# Patient Record
Sex: FEMALE | Race: WHITE | NOT HISPANIC OR LATINO | Employment: FULL TIME | URBAN - METROPOLITAN AREA
[De-identification: names, ages, dates, MRNs, and addresses within clinical notes are randomized per-mention and may not be internally consistent; named-entity substitution may affect disease eponyms.]

---

## 2017-08-14 ENCOUNTER — TELEPHONE (OUTPATIENT)
Dept: NURSING | Facility: CLINIC | Age: 37
End: 2017-08-14

## 2017-08-14 DIAGNOSIS — N92.0 MENORRHAGIA WITH REGULAR CYCLE: ICD-10-CM

## 2017-08-14 RX ORDER — DROSPIRENONE AND ETHINYL ESTRADIOL 0.03MG-3MG
1 KIT ORAL DAILY
Qty: 28 TABLET | Refills: 0 | Status: SHIPPED | OUTPATIENT
Start: 2017-08-14 | End: 2017-09-05

## 2017-08-14 NOTE — TELEPHONE ENCOUNTER
Pt calling requesting refill for her BCP.  Pt understands that she needs to schedule her annual exam which is due in September.    Janice 3-0.03mg      Last Written Prescription Date:  9/26/16  Last Fill Quantity: 84,   # refills: 3  Last Office Visit with [facility] primary care provider:  9/26/2016  Future Office visit: NONE    Please contact our office for any further refills, Due for annual exam. This is a 1 month extension per [facility] protocol.

## 2017-09-03 ENCOUNTER — HEALTH MAINTENANCE LETTER (OUTPATIENT)
Age: 37
End: 2017-09-03

## 2017-09-05 ENCOUNTER — TELEPHONE (OUTPATIENT)
Dept: OBGYN | Facility: CLINIC | Age: 37
End: 2017-09-05
Payer: COMMERCIAL

## 2017-09-05 ENCOUNTER — TELEPHONE (OUTPATIENT)
Dept: OBGYN | Facility: CLINIC | Age: 37
End: 2017-09-05

## 2017-09-05 DIAGNOSIS — N92.0 MENORRHAGIA WITH REGULAR CYCLE: ICD-10-CM

## 2017-09-05 RX ORDER — DROSPIRENONE AND ETHINYL ESTRADIOL 0.03MG-3MG
1 KIT ORAL DAILY
Qty: 56 TABLET | Refills: 0 | Status: SHIPPED | OUTPATIENT
Start: 2017-09-05 | End: 2017-10-25

## 2017-09-05 NOTE — TELEPHONE ENCOUNTER
Pt is wanting a refill for her DONTA rx. She said she was already given one 30 day supply  . She is completely out at this point . She did schedule a yearly exam with AJ. But it is not until October

## 2017-10-25 ENCOUNTER — OFFICE VISIT (OUTPATIENT)
Dept: OBGYN | Facility: CLINIC | Age: 37
End: 2017-10-25
Payer: COMMERCIAL

## 2017-10-25 VITALS
WEIGHT: 135 LBS | BODY MASS INDEX: 22.49 KG/M2 | HEIGHT: 65 IN | DIASTOLIC BLOOD PRESSURE: 74 MMHG | SYSTOLIC BLOOD PRESSURE: 110 MMHG

## 2017-10-25 DIAGNOSIS — N92.0 MENORRHAGIA WITH REGULAR CYCLE: ICD-10-CM

## 2017-10-25 DIAGNOSIS — Z01.419 ENCOUNTER FOR GYNECOLOGICAL EXAMINATION WITHOUT ABNORMAL FINDING: Primary | ICD-10-CM

## 2017-10-25 PROCEDURE — G0145 SCR C/V CYTO,THINLAYER,RESCR: HCPCS | Performed by: OBSTETRICS & GYNECOLOGY

## 2017-10-25 PROCEDURE — 99395 PREV VISIT EST AGE 18-39: CPT | Performed by: OBSTETRICS & GYNECOLOGY

## 2017-10-25 PROCEDURE — 87624 HPV HI-RISK TYP POOLED RSLT: CPT | Performed by: OBSTETRICS & GYNECOLOGY

## 2017-10-25 ASSESSMENT — ANXIETY QUESTIONNAIRES
3. WORRYING TOO MUCH ABOUT DIFFERENT THINGS: NOT AT ALL
5. BEING SO RESTLESS THAT IT IS HARD TO SIT STILL: NOT AT ALL
6. BECOMING EASILY ANNOYED OR IRRITABLE: SEVERAL DAYS
IF YOU CHECKED OFF ANY PROBLEMS ON THIS QUESTIONNAIRE, HOW DIFFICULT HAVE THESE PROBLEMS MADE IT FOR YOU TO DO YOUR WORK, TAKE CARE OF THINGS AT HOME, OR GET ALONG WITH OTHER PEOPLE: NOT DIFFICULT AT ALL
7. FEELING AFRAID AS IF SOMETHING AWFUL MIGHT HAPPEN: NOT AT ALL
1. FEELING NERVOUS, ANXIOUS, OR ON EDGE: NOT AT ALL
GAD7 TOTAL SCORE: 1
2. NOT BEING ABLE TO STOP OR CONTROL WORRYING: NOT AT ALL

## 2017-10-25 ASSESSMENT — PATIENT HEALTH QUESTIONNAIRE - PHQ9
SUM OF ALL RESPONSES TO PHQ QUESTIONS 1-9: 2
5. POOR APPETITE OR OVEREATING: NOT AT ALL

## 2017-10-25 NOTE — LETTER
October 31, 2017    Shayna Dougherty  [address] [address] [address]    Dear Shayna,  We are happy to inform you that your PAP smear result from 10/25/17 is normal.  We are now able to do a follow up test on PAP smears. The DNA test is for HPV (Human Papilloma Virus). Cervical cancer is closely linked with certain types of HPV. Your result showed no evidence of high risk HPV.  Therefore we recommend you return in 3 years for your next pap smear.  You will still need to return to the clinic every year for an annual exam and other preventive tests.  Please contact the clinic at [phone] with any questions.  Sincerely,    Alfreda Cavanaugh MD/corey

## 2017-10-25 NOTE — PROGRESS NOTES
Shayna is a 37 year old  female who presents for annual exam.     Besides routine health maintenance, she has no other health concerns today .    HPI:  The patient does not have a PCP  Patient is doing really well. 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 Can't take on more though b/c so busy with the three kids to get them everywhere and really enjoys staying home and being there for them. marek is 11 and in 6th. Blake is 9 and heaven is 7.5.    Just last year went on ocps again d/t really heavy periods and acne. Is doin great on them. No issues, periods are much lighter, no cramping and skin has completely cleared up. No concerns regarding being back on ocps and would like to continue for the time being.    Exercising 1-2x/week but for the most part eat really quite healthy.      GYNECOLOGIC HISTORY:    Patient's last menstrual period was 10/06/2017 (exact date).  Her current contraception method is: oral contraceptives.  She  reports that she has never smoked. She has never used smokeless tobacco.      Patient is sexually active.  STD testing offered?  Declined  Last PHQ-9 score on record =   PHQ-9 SCORE 10/25/2017   Total Score 2     Last GAD7 score on record =   JARAD-7 SCORE 10/25/2017   Total Score 1     Alcohol Score = 1    HEALTH MAINTENANCE:  Cholesterol: (  Cholesterol   Date Value Ref Range Status   2016 157 <200 mg/dL Final    NA  Last Mammo: NA, Result: not applicable, Next Mammo: NA   Pap: 10/31/2012 WNL HPV (-)neg  Colonoscopy:  NA, Result: not applicable, Next Colonoscopy: NA years.  Dexa:  NA    Health maintenance updated:  yes    HISTORY:  Obstetric History       T3      L3     SAB0   TAB0   Ectopic0   Multiple0   Live Births3       # Outcome Date GA Lbr Yaidel/2nd Weight Sex Delivery Anes PTL Lv   4 Term 01/22/10 41w0d  8 lb 12 oz (3.969 kg) F Vag-Spont EPI  AIDEN      Name: Heaven   3 Term 08 38w0d  7 lb 15 oz (3.6 kg) M Vag-Spont EPI  AIDEN      Name: Blake  "  2 SAB 07 6w0d    SAB      1 Term 06 39w0d  7 lb 10 oz (3.459 kg) F Vag-Spont EPI  AIDEN      Name: Marissa          Patient Active Problem List   Diagnosis     Menorrhagia with regular cycle     Acne, unspecified acne type     Family history of ischemic heart disease     Past Surgical History:   Procedure Laterality Date     NO HISTORY OF SURGERY        Social History   Substance Use Topics     Smoking status: Never Smoker     Smokeless tobacco: Never Used     Alcohol use No      Problem (# of Occurrences) Relation (Name,Age of Onset)    Alzheimer Disease (1) Paternal Grandmother    Breast Cancer (1) Mother (58)    C.A.D. (1) Maternal Grandfather:  in 50's of MI    DIABETES (1) Mother: pre-diabetes    Hyperlipidemia (1) Mother    Hypertension (1) Mother    Myocardial Infarction (1) Mother (59)            Current Outpatient Prescriptions   Medication Sig     drospirenone-ethinyl estradiol (SARAVANAN) 3-0.03 MG per tablet Take 1 tablet by mouth daily     multivitamin, therapeutic with minerals (MULTI-VITAMIN) TABS Take 1 tablet by mouth daily     No current facility-administered medications for this visit.      No Known Allergies    Past medical, surgical, social and family histories were reviewed and updated in EPIC.    ROS:   12 point review of systems negative other than symptoms noted below.    EXAM:  /74  Ht 5' 5\" (1.651 m)  Wt 135 lb (61.2 kg)  LMP 10/06/2017 (Exact Date)  Breastfeeding? No  BMI 22.47 kg/m2   BMI: Body mass index is 22.47 kg/(m^2).    PHYSICAL EXAM:  Constitutional:  Appearance: Well nourished, well developed, alert, in no acute distress  Neck:  Lymph Nodes:  No lymphadenopathy present    Thyroid:  Gland size normal, nontender, no nodules or masses present  on palpation  Chest:  Respiratory Effort:  Breathing unlabored  Cardiovascular:    Heart: Auscultation:  Regular rate, normal rhythm, no murmurs present  Breasts: Palpation of Breasts and Axillae:  No masses present on " palpation, no breast tenderness. and No nodularity, asymmetry or nipple discharge bilaterally.  Gastrointestinal:   Abdominal Examination:  Abdomen nontender to palpation, tone normal without rigidity or guarding, no masses present, umbilicus without lesions   Liver and Spleen:  No hepatomegaly present, liver nontender to palpation    Hernias:  No hernias present  Lymphatic: Lymph Nodes:  No other lymphadenopathy present  Skin:  General Inspection:  No rashes present, no lesions present, no areas of  discoloration    Genitalia and Groin:  No rashes present, no lesions present, no areas of  discoloration, no masses present  Neurologic/Psychiatric:    Mental Status:  Oriented X3     Pelvic Exam:  External Genitalia:     Normal appearance for age, no discharge present, no tenderness present, no inflammatory lesions present, color normal  Vagina:     Normal vaginal vault without central or paravaginal defects, no discharge present, no inflammatory lesions present, no masses present  Bladder:     Nontender to palpation  Urethra:   Urethral Body:  Urethra palpation normal, urethra structural support normal   Urethral Meatus:  No erythema or lesions present  Cervix:     Appearance healthy, no lesions present, nontender to palpation, no bleeding present  Uterus:     Uterus: firm, normal sized and nontender, anteverted in position.   Adnexa:     No adnexal tenderness present, no adnexal masses present  Perineum:     Perineum within normal limits, no evidence of trauma, no rashes or skin lesions present  Anus:     Anus within normal limits, no hemorrhoids present  Inguinal Lymph Nodes:     No lymphadenopathy present  Pubic Hair:     Normal pubic hair distribution for age  Genitalia and Groin:     No rashes present, no lesions present, no areas of discoloration, no masses present      COUNSELING:   Reviewed preventive health counseling, as reflected in patient instructions    BMI: Body mass index is 22.47  kg/(m^2).        ASSESSMENT:  37 year old female with satisfactory annual exam.    ICD-10-CM    1. Encounter for gynecological examination without abnormal finding Z01.419 Pap imaged thin layer screen with HPV - recommended age 30 - 65     HPV High Risk Types DNA Cervical   2. Menorrhagia with regular cycle N92.0 drospirenone-ethinyl estradiol (SARAVANAN) 3-0.03 MG per tablet       PLAN:  Pap and cotesting done today as she was due  Will continue on her saravanan for menorrhagia and acne as it's working very well. Refills sent in.  Fasting labs great last year so no need to repeat for another 2-3 years at most and only then given her heart disease in family history.    Alfreda Cavanaugh MD

## 2017-10-25 NOTE — MR AVS SNAPSHOT
"              After Visit Summary   10/25/2017    Shayna Dougherty    MRN: [id number]           Patient Information     Date Of Birth          1980        Visit Information        Provider Department      10/25/2017 11:00 AM Alfreda Cavanaugh MD [facility]        Today's Diagnoses     Encounter for gynecological examination without abnormal finding    -  1    Menorrhagia with regular cycle           Follow-ups after your visit        Who to contact     If you have questions or need follow up information about today's clinic visit or your schedule please contact [facility] directly at [phone].  Normal or non-critical lab and imaging results will be communicated to you by [employer], letter or phone within 4 business days after the clinic has received the results. If you do not hear from us within 7 days, please contact the clinic through [employer] or phone. If you have a critical or abnormal lab result, we will notify you by phone as soon as possible.  Submit refill requests through [employer] or call your pharmacy and they will forward the refill request to us. Please allow 3 business days for your refill to be completed.          Additional Information About Your Visit        MyChart Information     [employer] lets you send messages to your doctor, view your test results, renew your prescriptions, schedule appointments and more. To sign up, go to [email or web address] . Click on \"Log in\" on the left side of the screen, which will take you to the Welcome page. Then click on \"Sign up Now\" on the right side of the page.     You will be asked to enter the access code listed below, as well as some personal information. Please follow the directions to create your username and password.     Your access code is: [email or web address]  Expires: 2018 12:36 PM     Your access code will  in 90 days. If you need help or a new code, please call your [place] clinic or [phone].      " "  Care EveryWhere ID     This is your Care EveryWhere ID. This could be used by other organizations to access your [facility] medical records  [id number]        Your Vitals Were     Height Last Period Breastfeeding? BMI (Body Mass Index)          5' 5\" (1.651 m) 10/06/2017 (Exact Date) No 22.47 kg/m2         Blood Pressure from Last 3 Encounters:   10/25/17 110/74   09/26/16 100/64   11/25/13 120/74    Weight from Last 3 Encounters:   10/25/17 135 lb (61.2 kg)   09/26/16 141 lb (64 kg)   11/25/13 142 lb (64.4 kg)              Today, you had the following     No orders found for display       Primary Care Provider Office Phone # Fax #    Fani Adrianna Calvert [phone] [address] [address] [facility] [address]        Equal Access to Services     [facility]: Hadii aad ku hadasho Soomaali, waaxda luqadaha, qaybta kaalmada adeegyada, waxay idiin hayaan kenton kingn . So [facility] [phone].    ATENCIÓN: Si habla español, tiene a yanez disposición servicios gratuitos de asistencia lingüística. Llame al [phone].    We comply with applicable federal civil rights laws and [place] laws. We do not discriminate on the basis of race, color, national origin, age, disability, sex, sexual orientation, or gender identity.            Thank you!     Thank you for choosing [facility]  for your care. Our goal is always to provide you with excellent care. Hearing back from our patients is one way we can continue to improve our services. Please take a few minutes to complete the written survey that you may receive in the mail after your visit with us. Thank you!             Your Updated Medication List - Protect others around you: Learn how to safely use, store and throw away your medicines at www.disposemymeds.org.          This list is accurate as of: 10/25/17 12:37 PM.  Always use your most recent med list.                   Brand Name Dispense Instructions for use Diagnosis    " drospirenone-ethinyl estradiol 3-0.03 MG per tablet    SARAVANAN    56 tablet    Take 1 tablet by mouth daily    Menorrhagia with regular cycle       Multi-vitamin Tabs tablet     100 tablet    Take 1 tablet by mouth daily

## 2017-10-26 ASSESSMENT — ANXIETY QUESTIONNAIRES: GAD7 TOTAL SCORE: 1

## 2017-10-27 LAB
COPATH REPORT: NORMAL
PAP: NORMAL

## 2017-10-28 RX ORDER — DROSPIRENONE AND ETHINYL ESTRADIOL 0.03MG-3MG
1 KIT ORAL DAILY
Qty: 84 TABLET | Refills: 4 | Status: SHIPPED | OUTPATIENT
Start: 2017-10-28 | End: 2018-11-30

## 2017-10-30 LAB
FINAL DIAGNOSIS: NORMAL
HPV HR 12 DNA CVX QL NAA+PROBE: NEGATIVE
HPV16 DNA SPEC QL NAA+PROBE: NEGATIVE
HPV18 DNA SPEC QL NAA+PROBE: NEGATIVE
SPECIMEN DESCRIPTION: NORMAL

## 2017-11-17 ENCOUNTER — TELEPHONE (OUTPATIENT)
Dept: NURSING | Facility: CLINIC | Age: 37
End: 2017-11-17

## 2017-11-17 DIAGNOSIS — Z13.0 SCREENING FOR IRON DEFICIENCY ANEMIA: Primary | ICD-10-CM

## 2017-11-17 DIAGNOSIS — R53.83 FATIGUE: ICD-10-CM

## 2017-11-17 NOTE — TELEPHONE ENCOUNTER
We certainly can do it but I think she wasn't on ocps then and bleeding heavy and now is and not bleeding heavy. But yes, can do iron, tibc, ferritin if she'd like can do a vit D as well as that may be even more related-if we haven't done one

## 2018-11-26 ENCOUNTER — TELEPHONE (OUTPATIENT)
Dept: OBGYN | Facility: CLINIC | Age: 38
End: 2018-11-26

## 2018-11-26 NOTE — TELEPHONE ENCOUNTER
Talked with patient re: overdue lab. She has an appt for her annual on Friday 11/30 so she will plan on having it drawn then.

## 2018-11-30 ENCOUNTER — OFFICE VISIT (OUTPATIENT)
Dept: OBGYN | Facility: CLINIC | Age: 38
End: 2018-11-30
Payer: COMMERCIAL

## 2018-11-30 VITALS
SYSTOLIC BLOOD PRESSURE: 120 MMHG | BODY MASS INDEX: 24.69 KG/M2 | HEIGHT: 65 IN | DIASTOLIC BLOOD PRESSURE: 78 MMHG | HEART RATE: 80 BPM | WEIGHT: 148.2 LBS

## 2018-11-30 DIAGNOSIS — Z23 NEED FOR TDAP VACCINATION: ICD-10-CM

## 2018-11-30 DIAGNOSIS — R53.83 FATIGUE, UNSPECIFIED TYPE: ICD-10-CM

## 2018-11-30 DIAGNOSIS — Z01.419 ENCOUNTER FOR GYNECOLOGICAL EXAMINATION WITHOUT ABNORMAL FINDING: Primary | ICD-10-CM

## 2018-11-30 DIAGNOSIS — Z13.0 SCREENING FOR IRON DEFICIENCY ANEMIA: ICD-10-CM

## 2018-11-30 DIAGNOSIS — N92.0 MENORRHAGIA WITH REGULAR CYCLE: ICD-10-CM

## 2018-11-30 PROCEDURE — 82728 ASSAY OF FERRITIN: CPT | Performed by: OBSTETRICS & GYNECOLOGY

## 2018-11-30 PROCEDURE — 83540 ASSAY OF IRON: CPT | Performed by: OBSTETRICS & GYNECOLOGY

## 2018-11-30 PROCEDURE — 90471 IMMUNIZATION ADMIN: CPT | Performed by: NURSE PRACTITIONER

## 2018-11-30 PROCEDURE — 83550 IRON BINDING TEST: CPT | Performed by: OBSTETRICS & GYNECOLOGY

## 2018-11-30 PROCEDURE — 99395 PREV VISIT EST AGE 18-39: CPT | Performed by: NURSE PRACTITIONER

## 2018-11-30 PROCEDURE — 36415 COLL VENOUS BLD VENIPUNCTURE: CPT | Performed by: OBSTETRICS & GYNECOLOGY

## 2018-11-30 PROCEDURE — 82306 VITAMIN D 25 HYDROXY: CPT | Performed by: OBSTETRICS & GYNECOLOGY

## 2018-11-30 PROCEDURE — 90715 TDAP VACCINE 7 YRS/> IM: CPT | Performed by: NURSE PRACTITIONER

## 2018-11-30 RX ORDER — DROSPIRENONE AND ETHINYL ESTRADIOL 0.03MG-3MG
1 KIT ORAL DAILY
Qty: 84 TABLET | Refills: 4 | Status: SHIPPED | OUTPATIENT
Start: 2018-11-30 | End: 2019-12-19

## 2018-11-30 ASSESSMENT — PATIENT HEALTH QUESTIONNAIRE - PHQ9
5. POOR APPETITE OR OVEREATING: NOT AT ALL
SUM OF ALL RESPONSES TO PHQ QUESTIONS 1-9: 0

## 2018-11-30 ASSESSMENT — ANXIETY QUESTIONNAIRES
1. FEELING NERVOUS, ANXIOUS, OR ON EDGE: NOT AT ALL
3. WORRYING TOO MUCH ABOUT DIFFERENT THINGS: NOT AT ALL
7. FEELING AFRAID AS IF SOMETHING AWFUL MIGHT HAPPEN: NOT AT ALL
6. BECOMING EASILY ANNOYED OR IRRITABLE: SEVERAL DAYS
5. BEING SO RESTLESS THAT IT IS HARD TO SIT STILL: NOT AT ALL
GAD7 TOTAL SCORE: 1
IF YOU CHECKED OFF ANY PROBLEMS ON THIS QUESTIONNAIRE, HOW DIFFICULT HAVE THESE PROBLEMS MADE IT FOR YOU TO DO YOUR WORK, TAKE CARE OF THINGS AT HOME, OR GET ALONG WITH OTHER PEOPLE: NOT DIFFICULT AT ALL
2. NOT BEING ABLE TO STOP OR CONTROL WORRYING: NOT AT ALL

## 2018-11-30 NOTE — MR AVS SNAPSHOT
"              After Visit Summary   11/30/2018    Shayna Dougherty    MRN: [id number]           Patient Information     Date Of Birth          1980        Visit Information        Provider Department      11/30/2018 2:10 PM Sarah Davis APRN CNP [facility]        Today's Diagnoses     Encounter for gynecological examination without abnormal finding    -  1    Menorrhagia with regular cycle        Need for Tdap vaccination        Screening for iron deficiency anemia        Fatigue, unspecified type           Follow-ups after your visit        Follow-up notes from your care team     Return in about 1 year (around 11/30/2019).      Who to contact     If you have questions or need follow up information about today's clinic visit or your schedule please [facility] at [phone].  Normal or non-critical lab and imaging results will be communicated to you by MyChart, letter or phone within 4 business days after the clinic has received the results. If you do not hear from us within 7 days, please contact the clinic through MyChart or phone. If you have a critical or abnormal lab result, we will notify you by phone as soon as possible.  Submit refill requests through [employer] or call your pharmacy and they will forward the refill request to us. Please allow 3 business days for your refill to be completed.          Additional Information About Your Visit        MyChart Information     [employer] lets you send messages to your doctor, view your test results, renew your prescriptions, schedule appointments and more. To sign up, go to [email or web address] . Click on \"Log in\" on the left side of the screen, which will take you to the Welcome page. Then click on \"Sign up Now\" on the right side of the page.     You will be asked to enter the access code listed below, as well as some personal information. Please follow the directions to create your username and " "password.     Your access code is: [email or web address]  Expires: 2019  2:07 PM     Your access code will  in 90 days. If you need help or a new code, please call your [facility] clinic or [phone].        Care EveryWhere ID     This is your Care EveryWhere ID. This could be used by other organizations to access your [facility] medical records  [id number]        Your Vitals Were     Pulse Height Last Period Breastfeeding? BMI (Body Mass Index)       80 5' 5\" (1.651 m) 2018 (Exact Date) No 24.66 kg/m2        Blood Pressure from Last 3 Encounters:   18 120/78   10/25/17 110/74   16 100/64    Weight from Last 3 Encounters:   18 148 lb 3.2 oz (67.2 kg)   10/25/17 135 lb (61.2 kg)   16 141 lb (64 kg)              We Performed the Following     **Iron and iron binding capacity FUTURE anytime     **Vitamin D Deficiency FUTURE anytime     Ferritin     TDAP, IM (10 - 64 YRS) - Adacel     VACCINE ADMINISTRATION, INITIAL          Where to get your medicines      These medications were sent to [facility] [place] - [address]  [address] [place] [address]     Phone:  [phone]     drospirenone-ethinyl estradiol 3-0.03 MG tablet          Primary Care Provider Office Phone # Fax #    [facility] [phone]       [place] [address]  [facility] [address]  [facility] [address]        Equal Access to Services     [facility]: Hadii aad ku hadashriley Somarco, waaxda luqadaha, qaybta kaalmahaven fang. So [facility] [phone].    ATENCIÓN: Si habla español, tiene a yanez disposición servicios gratuitos de asistencia lingüística. Beryl carmona [phone].    We comply with applicable federal civil rights laws and [place] laws. We do not discriminate on the basis of race, color, national origin, age, disability, sex, sexual orientation, or gender identity.       "      Thank you!     Thank you for choosing [facility]  for your care. Our goal is always to provide you with excellent care. Hearing back from our patients is one way we can continue to improve our services. Please take a few minutes to complete the written survey that you may receive in the mail after your visit with us. Thank you!             Your Updated Medication List - Protect others around you: Learn how to safely use, store and throw away your medicines at www.disposemymeds.org.          This list is accurate as of 11/30/18  3:22 PM.  Always use your most recent med list.                   Brand Name Dispense Instructions for use Diagnosis    drospirenone-ethinyl estradiol 3-0.03 MG tablet    SARAVANAN    84 tablet    Take 1 tablet by mouth daily    Menorrhagia with regular cycle       Multi-vitamin tablet     100 tablet    Take 1 tablet by mouth daily

## 2018-11-30 NOTE — PROGRESS NOTES
Shayna is a 38 year old  female who presents for annual exam.     Besides routine health maintenance, she has no other health concerns today .    HPI:  The patient's PCP is  [facility].  Pt here today for her annual exam. She is feeling well. Menses are regular, lighter with minimal cramping on OCP's. Denies any BTB or migraine/headaches.     She has questions regarding genetic testing. Her mother was dx with breast cancer at age 57. Pt does not know if she had genetic testing. Pt also has fam hx on her fathers side of alzheimer's. He is in his 60's and showing signs of the disease but will not get tested.     She has pended labs from  that will be done today.     She is up to date on her pap smear.       GYNECOLOGIC HISTORY:    Patient's last menstrual period was 2018 (exact date).  Her current contraception method is: Partner/Vasectomy.  She  reports that she has never smoked. She has never used smokeless tobacco.    Patient is sexually active.  STD testing offered?  Declined  Last PHQ-9 score on record =   PHQ-9 SCORE 2018   PHQ-9 Total Score 0     Last GAD7 score on record =   JARAD-7 SCORE 2018   Total Score 1     Alcohol Score = 2    HEALTH MAINTENANCE:  Cholesterol: 2016   Total= 157, Triglycerides=108, HDL=71, LDL=64  Cholesterol   Date Value Ref Range Status   2016 157 <200 mg/dL Final      Last Mammo: NA, Result: not applicable, Next Mammo: 4 years.   Pap: 10/25/2017 Neg, HPV-  Lab Results   Component Value Date    PAP NIL 10/25/2017      Colonoscopy:  NA, Result: not applicable, Next Colonoscopy: 14 years.  Dexa:  NA    Health maintenance updated:  yes    HISTORY:  Obstetric History       T3      L3     SAB1   TAB0   Ectopic0   Multiple0   Live Births3       # Outcome Date GA Lbr Yadiel/2nd Weight Sex Delivery Anes PTL Lv   4 Term 01/22/10 41w0d  8 lb 12 oz (3.969 kg) F Vag-Spont EPI  AIDEN      Name: Heaven   3 Term 08 38w0d   "7 lb 15 oz (3.6 kg) M Vag-Spont EPI  AIDEN      Name: Blake Irizarry SAB 07 6w0d    SAB      1 Term 06 39w0d  7 lb 10 oz (3.459 kg) F Vag-Spont EPI  AIDEN      Name: Marissa          Patient Active Problem List   Diagnosis     Menorrhagia with regular cycle     Acne, unspecified acne type     Family history of ischemic heart disease     Past Surgical History:   Procedure Laterality Date     NO HISTORY OF SURGERY        Social History   Substance Use Topics     Smoking status: Never Smoker     Smokeless tobacco: Never Used     Alcohol use 0.0 oz/week     0 Standard drinks or equivalent per week      Comment: Occas       Problem (# of Occurrences) Relation (Name,Age of Onset)    Alzheimer Disease (1) Paternal Grandmother    Breast Cancer (1) Mother (58)    C.A.D. (1) Maternal Grandfather:  in 50's of MI    Diabetes (1) Mother: pre-diabetes    Hyperlipidemia (1) Mother    Hypertension (1) Mother    Myocardial Infarction (1) Mother (59)            Current Outpatient Prescriptions   Medication Sig     drospirenone-ethinyl estradiol (SARAVANAN) 3-0.03 MG tablet Take 1 tablet by mouth daily     multivitamin, therapeutic with minerals (MULTI-VITAMIN) TABS Take 1 tablet by mouth daily     [DISCONTINUED] drospirenone-ethinyl estradiol (SARAVANAN) 3-0.03 MG per tablet Take 1 tablet by mouth daily     No current facility-administered medications for this visit.      No Known Allergies    Past medical, surgical, social and family histories were reviewed and updated in EPIC.    ROS:   12 point review of systems negative other than symptoms noted below.    EXAM:  /78  Pulse 80  Ht 5' 5\" (1.651 m)  Wt 148 lb 3.2 oz (67.2 kg)  LMP 2018 (Exact Date)  Breastfeeding? No  BMI 24.66 kg/m2   BMI: Body mass index is 24.66 kg/(m^2).    PHYSICAL EXAM:  Constitutional:  Appearance: Well nourished, well developed, alert, in no acute distress  Neck:  Lymph Nodes:  No lymphadenopathy present    Thyroid:  Gland size normal, " nontender, no nodules or masses present  on palpation  Chest:  Respiratory Effort:  Breathing unlabored  Cardiovascular:    Heart: Auscultation:  Regular rate, normal rhythm, no murmurs present  Breasts: Inspection of Breasts:  No lymphadenopathy present., Palpation of Breasts and Axillae:  No masses present on palpation, no breast tenderness., Axillary Lymph Nodes:  No lymphadenopathy present. and No nodularity, asymmetry or nipple discharge bilaterally.  Gastrointestinal:   Abdominal Examination:  Abdomen nontender to palpation, tone normal without rigidity or guarding, no masses present, umbilicus without lesions   Liver and Spleen:  No hepatomegaly present, liver nontender to palpation    Hernias:  No hernias present  Lymphatic: Lymph Nodes:  No other lymphadenopathy present  Skin:  General Inspection:  No rashes present, no lesions present, no areas of  discoloration    Genitalia and Groin:  No rashes present, no lesions present, no areas of  discoloration, no masses present  Neurologic/Psychiatric:    Mental Status:  Oriented X3     Pelvic Exam:  External Genitalia:     Normal appearance for age, no discharge present, no tenderness present, no inflammatory lesions present, color normal  Vagina:     Normal vaginal vault without central or paravaginal defects, no discharge present, no inflammatory lesions present, no masses present  Bladder:     Nontender to palpation  Urethra:   Urethral Body:  Urethra palpation normal, urethra structural support normal   Urethral Meatus:  No erythema or lesions present  Cervix:     Appearance healthy, no lesions present, nontender to palpation, no bleeding present  Uterus:     Uterus: firm, normal sized and nontender, anteverted in position.   Adnexa:     No adnexal tenderness present, no adnexal masses present  Perineum:     Perineum within normal limits, no evidence of trauma, no rashes or skin lesions present  Anus:     Anus within normal limits, no hemorrhoids  present  Inguinal Lymph Nodes:     No lymphadenopathy present  Pubic Hair:     Normal pubic hair distribution for age  Genitalia and Groin:     No rashes present, no lesions present, no areas of discoloration, no masses present      COUNSELING:   Special attention given to:        Regular exercise       Healthy diet/nutrition       Contraception    BMI: Body mass index is 24.66 kg/(m^2).      ASSESSMENT:  38 year old female with satisfactory annual exam.    ICD-10-CM    1. Encounter for gynecological examination without abnormal finding Z01.419    2. Menorrhagia with regular cycle N92.0 drospirenone-ethinyl estradiol (SARAVANAN) 3-0.03 MG tablet   3. Need for Tdap vaccination Z23 TDAP, IM (10 - 64 YRS) - Adacel     VACCINE ADMINISTRATION, INITIAL   4. Screening for iron deficiency anemia Z13.0 **Iron and iron binding capacity FUTURE anytime     Ferritin     CANCELED: Iron   5. Fatigue, unspecified type R53.83 **Vitamin D Deficiency FUTURE anytime       PLAN:  Normal gyn exam. Labs today. Pap guidelines discussed. Up to date. No pap collected today. Ok to continue OCp's x1 year. Pt will call if she needs BRCA testing if her mother did not have it done.     Alfreda Cavanaugh MD

## 2018-12-01 LAB
FERRITIN SERPL-MCNC: 18 NG/ML (ref 12–150)
IRON SATN MFR SERPL: 24 % (ref 15–46)
IRON SERPL-MCNC: 111 UG/DL (ref 35–180)
TIBC SERPL-MCNC: 464 UG/DL (ref 240–430)

## 2018-12-01 ASSESSMENT — ANXIETY QUESTIONNAIRES: GAD7 TOTAL SCORE: 1

## 2018-12-03 LAB — DEPRECATED CALCIDIOL+CALCIFEROL SERPL-MC: 35 UG/L (ref 20–75)

## 2019-08-30 NOTE — TELEPHONE ENCOUNTER
"Annual 10/25/17. Pt told at annual in 2016 that her iron storage was low. Pt is feeling very tired. Gets about 8-9 hours a night sleep but feels as if she never sleeps. Pt denies dizziness or light headed feeling. Pt denies urge to chew ice. Does crave very cold water. Pt wondering about repeat testing or iron storage. Routing to Dr. Cavanaugh. OK to order iron studies?    9/26/16 Cholesterol and thyroid and cbc are great. Not anemic but her storage form of iron is a little low meaning her back up stores are somewhat depleted but enough to make adequate hgb. She could try doing an iron supplement everyday to build that up but since she is about to start on ocps she could just do that and lighten her period and i'm guessing it'll replenish on its own. Her potassium and choloride are each just one point above normal. i'm not overly concerned by this as the blood may have \"broken apart/hemolyzed\" in the tube from the draw itself and caused this. I don't feel she needs to repeat it and it's likely jsut a range of lab error  " 4

## 2019-12-19 DIAGNOSIS — N92.0 MENORRHAGIA WITH REGULAR CYCLE: ICD-10-CM

## 2019-12-19 RX ORDER — DROSPIRENONE AND ETHINYL ESTRADIOL 0.03MG-3MG
1 KIT ORAL DAILY
Qty: 84 TABLET | Refills: 0 | Status: SHIPPED | OUTPATIENT
Start: 2019-12-19 | End: 2020-02-07

## 2019-12-19 NOTE — TELEPHONE ENCOUNTER
Patient took her last tablet yesterday, hoping to get today. Thank you.    Bridgett Isaac, Live  [facility]  [address] [place] [address] [phone]

## 2019-12-19 NOTE — TELEPHONE ENCOUNTER
"Requested Prescriptions   Pending Prescriptions Disp Refills     drospirenone-ethinyl estradiol (SARAVANAN) 3-0.03 MG tablet [Pharmacy Med Name: DROSPIRENONE-ETHINYL ES 3-0.03 TABS] 84 tablet 4     Sig: TAKE ONE TABLET BY MOUTH ONCE DAILY       Contraceptives Protocol Failed - 12/19/2019  9:09 AM        Failed - Recent (12 mo) or future (30 days) visit within the authorizing provider's specialty     Patient has had an office visit with the authorizing provider or a provider within the authorizing providers department within the previous 12 mos or has a future within next 30 days. See \"Patient Info\" tab in inbasket, or \"Choose Columns\" in Meds & Orders section of the refill encounter.              Passed - Patient is not a current smoker if age is 35 or older        Passed - Medication is active on med list        Passed - No active pregnancy on record        Passed - No positive pregnancy test in past 12 months        Last Written Prescription Date:  11/30/19  Last Fill Quantity: 84,  # refills: 4   Last office visit: 11/30/2018 with prescribing provider:  Mao   Future Office Visit:   Next 5 appointments (look out 90 days)    Feb 07, 2020  1:00 PM CST  PHYSICAL with Alfreda Cavanaugh MD  [facility] ([facility]) [address] [place] [id number] [phone]         Medication is being filled for 1 time refill only due to:  appointment scheduled  Gisselle Gillis RN on 12/19/2019 at 9:11 AM    "

## 2020-02-04 NOTE — PROGRESS NOTES
Shayna is a 39 year old  female who presents for annual exam.     Besides routine health maintenance,  she would like to have Dr. Cavanaugh listen to chest due to cough since October. .    HPI:  The patient's PCP is [facility].      Patient is doing well overall. Always had such heavy periods so eventually went on ocps and her periods were much lighter and regular. Had really good hair growth and never had had that before, acne cleared up, no migraines and no side effects. However feels like has no libido and knows that the ocp can be part of it and part of it is that she's been  for 16 yrs.    Went off her ocp for 3 months last year. Not sure libido was that much better. Acne stayed gone. Periods were definitely heavier and more crampy but not unmanageable. Had had iron deficiency in the past from heavy periods so was careful about that. hwoever after 3 months had lost so much hair that she had to cut it all and now it's thin with a bunch of regrowth but not the same. After those 3 months she went back on ocps and was on them until this last month. Got her /10 period and never restarted her ocps. Is really eating clear now, exercising regularly, got rid of as much chemical and packaged foods and sugar. Hoping that with just much  eating and living her periods will be ok w/o having to go on the pill. However if starts to lose all her hair again, or if her periods do get back to a horrible heavy level, then knows she will go back on.    When on ocps has so much discharge that is constantly changing her underwear and hates it. When off those three months did feel a bit better. Not urine. Has no incontinence. Has no sx of prolapse but in certain positions with sex and sometimes when trying to put in a tampon, will hit something that really hurts and wondering what that is. Isn't having more or less of that when on vs off her ocps though.    Went on a cruise last October.  Presumes she got sick b/c was coughing a lot and had a lot of nasal congestion and blowing her nose. Never had a sore throat, never felt sick, never had a fever. Nasal congestion is all gone but can tell she has some postnasal drip. However has been coughing since then. Was wheezing at first and that is now a bit better but not gone      GYNECOLOGIC HISTORY:    Patient's last menstrual period was 01/10/2020 (within days).    Regular menses? yes  Menses every 26-28 days.  Length of menses: 7 days    Her current contraception method is: oral contraceptives and vasectomy.  She  reports that she has never smoked. She has never used smokeless tobacco.    Patient is sexually active.  STD testing offered?  Declined  Last PHQ-9 score on record =   PHQ-9 SCORE 2020   PHQ-9 Total Score 0     Last GAD7 score on record =   JARAD-7 SCORE 2020   Total Score 1     Alcohol Score = 1    HEALTH MAINTENANCE:  Cholesterol:   Recent Labs   Lab Test 16  1220   CHOL 157   HDL 71   LDL 64   TRIG 108   GLUCOSE 83     Last Mammo: Not applicable, Next Mammo: Due at age 42   Pap: HPV: negative  Lab Results   Component Value Date    PAP NIL 10/25/2017      Colonoscopy:  NA, Next Colonoscopy: age 52.  Dexa:  NA    Health maintenance updated:  yes    HISTORY:  OB History    Para Term  AB Living   4 3 3 0 1 3   SAB TAB Ectopic Multiple Live Births   1 0 0 0 3      # Outcome Date GA Lbr Yadiel/2nd Weight Sex Delivery Anes PTL Lv   4 Term 01/22/10 41w0d  3.969 kg (8 lb 12 oz) F Vag-Spont EPI  AIDEN      Birth Comments: Followed and delivered by Mao. Was to have cytotec for closed cvx at 41 weeks but spont labor began at 40+4      Name: Heaven   3 Term 08 38w0d  3.6 kg (7 lb 15 oz) M Vag-Spont EPI  AIDEN      Birth Comments: Followed and delivered by Mao      Name: Blake   2 SAB 07 6w0d    SAB      1 Term 06 39w0d  3.459 kg (7 lb 10 oz) F Vag-Spont EPI  AIDEN      Birth Comments: Followed by Jan then  "Mao and delivered by Sedrick. SROM, Pitocin augmentation      Name: Marissa       Patient Active Problem List   Diagnosis     Menorrhagia with regular cycle     Acne, unspecified acne type     Family history of ischemic heart disease     Past Surgical History:   Procedure Laterality Date     NO HISTORY OF SURGERY        Social History     Tobacco Use     Smoking status: Never Smoker     Smokeless tobacco: Never Used   Substance Use Topics     Alcohol use: Yes     Alcohol/week: 0.0 standard drinks     Comment: Occas       Problem (# of Occurrences) Relation (Name,Age of Onset)    Alzheimer Disease (1) Paternal Grandmother    Breast Cancer (1) Mother (58)    C.A.D. (1) Maternal Grandfather:  in 50's of MI    Diabetes (1) Mother: pre-diabetes    Hyperlipidemia (1) Mother    Hypertension (1) Mother    Myocardial Infarction (1) Mother (59)            Current Outpatient Medications   Medication Sig     drospirenone-ethinyl estradiol (SARAVANAN) 3-0.03 MG tablet Take 1 tablet by mouth daily . Appointment needed for additional refills.     multivitamin, therapeutic with minerals (MULTI-VITAMIN) TABS Take 1 tablet by mouth daily     No current facility-administered medications for this visit.      No Known Allergies    Past medical, surgical, social and family histories were reviewed and updated in EPIC.    ROS:   12 point review of systems negative other than symptoms noted below or in the HPI.  No urinary frequency or dysuria, bladder or kidney problems, POSITIVE for:, heavy periods    EXAM:  /72 (BP Location: Right arm, Patient Position: Sitting, Cuff Size: Adult Regular)   Pulse 84   Ht 1.664 m (5' 5.5\")   Wt 70.1 kg (154 lb 9.6 oz)   LMP 01/10/2020 (Within Days)   Breastfeeding No   BMI 25.34 kg/m     BMI: Body mass index is 25.34 kg/m .    PHYSICAL EXAM:  Constitutional:   Appearance: Well nourished, well developed, alert, in no acute distress  Neck:  Lymph Nodes:  No lymphadenopathy " present    Thyroid:  Gland size normal, nontender, no nodules or masses present  on palpation  Chest:  Respiratory Effort:  Breathing unlabored  Cardiovascular:    Heart: Auscultation:  Regular rate, normal rhythm, no murmurs present  Breasts: Palpation of Breasts and Axillae:  No masses present on palpation, no breast tenderness. and No nodularity, asymmetry or nipple discharge bilaterally.  Gastrointestinal:   Abdominal Examination:  Abdomen nontender to palpation, tone normal without rigidity or guarding, no masses present, umbilicus without lesions   Liver and Spleen:  No hepatomegaly present, liver nontender to palpation    Hernias:  No hernias present  Lymphatic: Lymph Nodes:  No other lymphadenopathy present  Skin:  General Inspection:  No rashes present, no lesions present, no areas of  discoloration  Neurologic:    Mental Status:  Oriented X3.  Normal strength and tone, sensory exam                grossly normal, mentation intact and speech normal.    Psychiatric:   Mentation appears normal and affect normal/bright.         Pelvic Exam:  External Genitalia:     Normal appearance for age, no discharge present, no tenderness present, no inflammatory lesions present, color normal  Vagina:     Normal vaginal vault without central or paravaginal defects, no discharge present, no inflammatory lesions present, no masses present  Bladder:     Nontender to palpation  Urethra:   Urethral Body:  Urethra palpation normal, urethra structural support normal   Urethral Meatus:  No erythema or lesions present  Cervix:     Appearance healthy, no lesions present, nontender to palpation, no bleeding present  Uterus:     Uterus: firm, normal sized and nontender, anteverted in position.   Adnexa:     No adnexal tenderness present, no adnexal masses present  Perineum:     Perineum within normal limits, no evidence of trauma, no rashes or skin lesions present  Anus:     Anus within normal limits, no hemorrhoids present  Inguinal  Lymph Nodes:     No lymphadenopathy present  Pubic Hair:     Normal pubic hair distribution for age  Genitalia and Groin:     No rashes present, no lesions present, no areas of discoloration, no masses present      COUNSELING:   Reviewed preventive health counseling, as reflected in patient instructions  Special attention given to:        Contraception    BMI: Body mass index is 25.34 kg/m .      ASSESSMENT:  39 year old female with satisfactory annual exam.    ICD-10-CM    1. Encounter for gynecological examination without abnormal finding Z01.419    2. Menorrhagia with regular cycle N92.0 drospirenone-ethinyl estradiol (SARAVANAN) 3-0.03 MG tablet   3. Cough R05        PLAN:  Pap is UTD for 3 more years  Fasting labs were about 3.5 yrs ago. Not fasting today. Will plan to do them again in the next year or so. Can do a cbc and iron studies if periods are heavy and end up staying off her ocp. However if does go on the ocp and periods lighten then wouldn't need those  Refill sent on her ocp and placed on hold but active. Will give it a bit more time and see what happens. If has a lot of hair loss again or periods become really heavy again, will then likely go back on it as not sure her libido is really all that much from her ocp as much as 20 yrs with her  and 18 yrs .  Have a great relationship and put the work in to make sure everyone is getting what they need out of it so can make it work    Patient's cough sounds like it's from postnasal drip. Could very well have a chronic low grade sinusitis or allergies though hasn't had the latter before.  Encouraged to do nasacort BID for 2 weeks and see how she does. If her cough improves then can d/c spray and see if it recurs. If it does would have her see ENT  Discussed CXR. Lungs sound clear but somewhat decreased breath sounds. However this is bilateral and throughout and not localized  Patient lives far away so thinks she may just go to [facility] and get it done  and r/o any pneumonia b/c has been coughing for 3+ months already  Spent an additional 10 min, >50% of which was in face to face counseling time, addressing the cough    Alfreda Cavanaugh MD

## 2020-02-07 ENCOUNTER — TRANSFERRED RECORDS (OUTPATIENT)
Dept: HEALTH INFORMATION MANAGEMENT | Facility: CLINIC | Age: 40
End: 2020-02-07

## 2020-02-07 ENCOUNTER — OFFICE VISIT (OUTPATIENT)
Dept: OBGYN | Facility: CLINIC | Age: 40
End: 2020-02-07
Payer: COMMERCIAL

## 2020-02-07 VITALS
BODY MASS INDEX: 24.85 KG/M2 | HEART RATE: 84 BPM | SYSTOLIC BLOOD PRESSURE: 102 MMHG | HEIGHT: 66 IN | WEIGHT: 154.6 LBS | DIASTOLIC BLOOD PRESSURE: 72 MMHG

## 2020-02-07 DIAGNOSIS — Z01.419 ENCOUNTER FOR GYNECOLOGICAL EXAMINATION WITHOUT ABNORMAL FINDING: Primary | ICD-10-CM

## 2020-02-07 DIAGNOSIS — N92.0 MENORRHAGIA WITH REGULAR CYCLE: ICD-10-CM

## 2020-02-07 DIAGNOSIS — R05.9 COUGH: ICD-10-CM

## 2020-02-07 PROCEDURE — 99212 OFFICE O/P EST SF 10 MIN: CPT | Mod: 25 | Performed by: OBSTETRICS & GYNECOLOGY

## 2020-02-07 PROCEDURE — 99395 PREV VISIT EST AGE 18-39: CPT | Performed by: OBSTETRICS & GYNECOLOGY

## 2020-02-07 RX ORDER — DROSPIRENONE AND ETHINYL ESTRADIOL 0.03MG-3MG
1 KIT ORAL DAILY
Qty: 84 TABLET | Refills: 4 | Status: SHIPPED | OUTPATIENT
Start: 2020-02-07 | End: 2021-03-15

## 2020-02-07 ASSESSMENT — ANXIETY QUESTIONNAIRES
IF YOU CHECKED OFF ANY PROBLEMS ON THIS QUESTIONNAIRE, HOW DIFFICULT HAVE THESE PROBLEMS MADE IT FOR YOU TO DO YOUR WORK, TAKE CARE OF THINGS AT HOME, OR GET ALONG WITH OTHER PEOPLE: NOT DIFFICULT AT ALL
6. BECOMING EASILY ANNOYED OR IRRITABLE: SEVERAL DAYS
2. NOT BEING ABLE TO STOP OR CONTROL WORRYING: NOT AT ALL
1. FEELING NERVOUS, ANXIOUS, OR ON EDGE: NOT AT ALL
3. WORRYING TOO MUCH ABOUT DIFFERENT THINGS: NOT AT ALL
5. BEING SO RESTLESS THAT IT IS HARD TO SIT STILL: NOT AT ALL
7. FEELING AFRAID AS IF SOMETHING AWFUL MIGHT HAPPEN: NOT AT ALL
GAD7 TOTAL SCORE: 1

## 2020-02-07 ASSESSMENT — PATIENT HEALTH QUESTIONNAIRE - PHQ9
SUM OF ALL RESPONSES TO PHQ QUESTIONS 1-9: 0
5. POOR APPETITE OR OVEREATING: NOT AT ALL

## 2020-02-07 ASSESSMENT — MIFFLIN-ST. JEOR: SCORE: 1395.07

## 2020-02-08 ASSESSMENT — ANXIETY QUESTIONNAIRES: GAD7 TOTAL SCORE: 1

## 2020-02-11 ENCOUNTER — TELEPHONE (OUTPATIENT)
Dept: OBGYN | Facility: CLINIC | Age: 40
End: 2020-02-11

## 2020-02-11 ENCOUNTER — RESULT FOLLOW UP (OUTPATIENT)
Dept: OBGYN | Facility: CLINIC | Age: 40
End: 2020-02-11

## 2020-02-11 NOTE — TELEPHONE ENCOUNTER
Please let patient know her cxr is all normal.  No pneumonia and no other abnormality in the heart or lungs or bones/ribs

## 2020-02-23 ENCOUNTER — HEALTH MAINTENANCE LETTER (OUTPATIENT)
Age: 40
End: 2020-02-23

## 2020-11-29 ENCOUNTER — HEALTH MAINTENANCE LETTER (OUTPATIENT)
Age: 40
End: 2020-11-29

## 2021-01-15 ENCOUNTER — HEALTH MAINTENANCE LETTER (OUTPATIENT)
Age: 41
End: 2021-01-15

## 2021-03-15 ENCOUNTER — TELEPHONE (OUTPATIENT)
Dept: OBGYN | Facility: CLINIC | Age: 41
End: 2021-03-15

## 2021-03-15 DIAGNOSIS — N92.0 MENORRHAGIA WITH REGULAR CYCLE: ICD-10-CM

## 2021-03-15 RX ORDER — DROSPIRENONE AND ETHINYL ESTRADIOL 0.03MG-3MG
1 KIT ORAL DAILY
Qty: 84 TABLET | Refills: 0 | Status: SHIPPED | OUTPATIENT
Start: 2021-03-15 | End: 2021-04-19

## 2021-03-15 RX ORDER — DROSPIRENONE AND ETHINYL ESTRADIOL 0.03MG-3MG
KIT ORAL
Qty: 28 TABLET | Refills: 0 | Status: SHIPPED | OUTPATIENT
Start: 2021-03-15 | End: 2021-03-15

## 2021-03-15 NOTE — TELEPHONE ENCOUNTER
Annual scheduled w Dr Ray for 4/16/21  Requesting refill on OCP  Refill sent to get her to that apt - 3 mo per insurance    Sarah Cannon RN on 3/15/2021 at 12:05 PM

## 2021-03-15 NOTE — TELEPHONE ENCOUNTER
"Requested Prescriptions   Pending Prescriptions Disp Refills     drospirenone-ethinyl estradiol (SARAVANAN) 3-0.03 MG tablet [Pharmacy Med Name: DROSPIRENONE-ETHINYL ES 3-0.03 TABS] 84 tablet 4     Sig: TAKE ONE TABLET BY MOUTH ONCE DAILY       Contraceptives Protocol Failed - 3/15/2021 10:55 AM        Failed - Recent (12 mo) or future (30 days) visit within the authorizing provider's specialty     Patient has had an office visit with the authorizing provider or a provider within the authorizing providers department within the previous 12 mos or has a future within next 30 days. See \"Patient Info\" tab in inbasket, or \"Choose Columns\" in Meds & Orders section of the refill encounter.              Passed - Patient is not a current smoker if age is 35 or older        Passed - Medication is active on med list        Passed - No active pregnancy on record        Passed - No positive pregnancy test in past 12 months           Last Written Prescription Date:  2/7/20  Last Fill Quantity: 84,  # refills: 4   Last office visit: 2/7/2020 with prescribing provider:  Dr Alfreda Cavanaugh   Future Office Visit:  none    Medication is being filled for 1 time refill only due to:  appointment needed for further refills   Iva Odonnell RN on 3/15/2021 at 11:03 AM          "

## 2021-04-10 ENCOUNTER — HEALTH MAINTENANCE LETTER (OUTPATIENT)
Age: 41
End: 2021-04-10

## 2021-04-19 ENCOUNTER — OFFICE VISIT (OUTPATIENT)
Dept: OBGYN | Facility: CLINIC | Age: 41
End: 2021-04-19
Payer: COMMERCIAL

## 2021-04-19 VITALS
DIASTOLIC BLOOD PRESSURE: 54 MMHG | HEIGHT: 65 IN | HEART RATE: 80 BPM | WEIGHT: 131.4 LBS | SYSTOLIC BLOOD PRESSURE: 102 MMHG | BODY MASS INDEX: 21.89 KG/M2

## 2021-04-19 DIAGNOSIS — Z13.6 SCREENING FOR CARDIOVASCULAR CONDITION: ICD-10-CM

## 2021-04-19 DIAGNOSIS — L65.9 HAIR LOSS: ICD-10-CM

## 2021-04-19 DIAGNOSIS — Z13.228 SCREENING FOR METABOLIC DISORDER: ICD-10-CM

## 2021-04-19 DIAGNOSIS — Z86.2 HISTORY OF IRON DEFICIENCY ANEMIA: ICD-10-CM

## 2021-04-19 DIAGNOSIS — Z13.29 SCREENING FOR THYROID DISORDER: ICD-10-CM

## 2021-04-19 DIAGNOSIS — Z13.0 SCREENING FOR DISORDER OF BLOOD AND BLOOD-FORMING ORGANS: ICD-10-CM

## 2021-04-19 DIAGNOSIS — N92.0 MENORRHAGIA WITH REGULAR CYCLE: ICD-10-CM

## 2021-04-19 DIAGNOSIS — Z13.21 ENCOUNTER FOR VITAMIN DEFICIENCY SCREENING: ICD-10-CM

## 2021-04-19 DIAGNOSIS — Z30.011 VISIT FOR ORAL CONTRACEPTIVE PRESCRIPTION: ICD-10-CM

## 2021-04-19 DIAGNOSIS — Z01.419 ENCOUNTER FOR GYNECOLOGICAL EXAMINATION WITHOUT ABNORMAL FINDING: Primary | ICD-10-CM

## 2021-04-19 LAB
ALBUMIN SERPL-MCNC: 3.5 G/DL (ref 3.4–5)
ALP SERPL-CCNC: 75 U/L (ref 40–150)
ALT SERPL W P-5'-P-CCNC: 21 U/L (ref 0–50)
ANION GAP SERPL CALCULATED.3IONS-SCNC: 3 MMOL/L (ref 3–14)
AST SERPL W P-5'-P-CCNC: 15 U/L (ref 0–45)
BILIRUB SERPL-MCNC: 0.3 MG/DL (ref 0.2–1.3)
BUN SERPL-MCNC: 20 MG/DL (ref 7–30)
CALCIUM SERPL-MCNC: 9.2 MG/DL (ref 8.5–10.1)
CHLORIDE SERPL-SCNC: 103 MMOL/L (ref 94–109)
CHOLEST SERPL-MCNC: 218 MG/DL
CO2 SERPL-SCNC: 31 MMOL/L (ref 20–32)
CREAT SERPL-MCNC: 0.79 MG/DL (ref 0.52–1.04)
ERYTHROCYTE [DISTWIDTH] IN BLOOD BY AUTOMATED COUNT: 12.1 % (ref 10–15)
FERRITIN SERPL-MCNC: 33 NG/ML (ref 12–150)
GFR SERPL CREATININE-BSD FRML MDRD: >90 ML/MIN/{1.73_M2}
GLUCOSE SERPL-MCNC: 82 MG/DL (ref 70–99)
HCT VFR BLD AUTO: 40.8 % (ref 35–47)
HDLC SERPL-MCNC: 80 MG/DL
HGB BLD-MCNC: 13.5 G/DL (ref 11.7–15.7)
IRON SATN MFR SERPL: 17 % (ref 15–46)
IRON SERPL-MCNC: 82 UG/DL (ref 35–180)
LDLC SERPL CALC-MCNC: 120 MG/DL
MCH RBC QN AUTO: 30.1 PG (ref 26.5–33)
MCHC RBC AUTO-ENTMCNC: 33.1 G/DL (ref 31.5–36.5)
MCV RBC AUTO: 91 FL (ref 78–100)
NONHDLC SERPL-MCNC: 138 MG/DL
PLATELET # BLD AUTO: 390 10E9/L (ref 150–450)
POTASSIUM SERPL-SCNC: 4.2 MMOL/L (ref 3.4–5.3)
PROT SERPL-MCNC: 7.6 G/DL (ref 6.8–8.8)
RBC # BLD AUTO: 4.49 10E12/L (ref 3.8–5.2)
SODIUM SERPL-SCNC: 137 MMOL/L (ref 133–144)
TIBC SERPL-MCNC: 488 UG/DL (ref 240–430)
TRIGL SERPL-MCNC: 90 MG/DL
TSH SERPL DL<=0.005 MIU/L-ACNC: 1.43 MU/L (ref 0.4–4)
WBC # BLD AUTO: 8.2 10E9/L (ref 4–11)

## 2021-04-19 PROCEDURE — 36415 COLL VENOUS BLD VENIPUNCTURE: CPT | Performed by: OBSTETRICS & GYNECOLOGY

## 2021-04-19 PROCEDURE — 83540 ASSAY OF IRON: CPT | Performed by: OBSTETRICS & GYNECOLOGY

## 2021-04-19 PROCEDURE — 83550 IRON BINDING TEST: CPT | Performed by: OBSTETRICS & GYNECOLOGY

## 2021-04-19 PROCEDURE — 85027 COMPLETE CBC AUTOMATED: CPT | Performed by: OBSTETRICS & GYNECOLOGY

## 2021-04-19 PROCEDURE — 80061 LIPID PANEL: CPT | Performed by: OBSTETRICS & GYNECOLOGY

## 2021-04-19 PROCEDURE — 80053 COMPREHEN METABOLIC PANEL: CPT | Performed by: OBSTETRICS & GYNECOLOGY

## 2021-04-19 PROCEDURE — 84443 ASSAY THYROID STIM HORMONE: CPT | Performed by: OBSTETRICS & GYNECOLOGY

## 2021-04-19 PROCEDURE — 99395 PREV VISIT EST AGE 18-39: CPT | Performed by: OBSTETRICS & GYNECOLOGY

## 2021-04-19 PROCEDURE — 82306 VITAMIN D 25 HYDROXY: CPT | Performed by: OBSTETRICS & GYNECOLOGY

## 2021-04-19 PROCEDURE — 82728 ASSAY OF FERRITIN: CPT | Performed by: OBSTETRICS & GYNECOLOGY

## 2021-04-19 RX ORDER — DROSPIRENONE AND ETHINYL ESTRADIOL 0.03MG-3MG
1 KIT ORAL DAILY
Qty: 84 TABLET | Refills: 3 | Status: SHIPPED | OUTPATIENT
Start: 2021-04-19 | End: 2022-05-04

## 2021-04-19 SDOH — ECONOMIC STABILITY: INCOME INSECURITY: HOW HARD IS IT FOR YOU TO PAY FOR THE VERY BASICS LIKE FOOD, HOUSING, MEDICAL CARE, AND HEATING?: NOT ASKED

## 2021-04-19 SDOH — ECONOMIC STABILITY: FOOD INSECURITY: WITHIN THE PAST 12 MONTHS, YOU WORRIED THAT YOUR FOOD WOULD RUN OUT BEFORE YOU GOT MONEY TO BUY MORE.: NOT ASKED

## 2021-04-19 SDOH — ECONOMIC STABILITY: FOOD INSECURITY: WITHIN THE PAST 12 MONTHS, THE FOOD YOU BOUGHT JUST DIDN'T LAST AND YOU DIDN'T HAVE MONEY TO GET MORE.: NOT ASKED

## 2021-04-19 SDOH — ECONOMIC STABILITY: TRANSPORTATION INSECURITY
IN THE PAST 12 MONTHS, HAS LACK OF TRANSPORTATION KEPT YOU FROM MEETINGS, WORK, OR FROM GETTING THINGS NEEDED FOR DAILY LIVING?: NOT ASKED

## 2021-04-19 SDOH — ECONOMIC STABILITY: TRANSPORTATION INSECURITY
IN THE PAST 12 MONTHS, HAS THE LACK OF TRANSPORTATION KEPT YOU FROM MEDICAL APPOINTMENTS OR FROM GETTING MEDICATIONS?: NOT ASKED

## 2021-04-19 ASSESSMENT — ANXIETY QUESTIONNAIRES
6. BECOMING EASILY ANNOYED OR IRRITABLE: NOT AT ALL
7. FEELING AFRAID AS IF SOMETHING AWFUL MIGHT HAPPEN: NOT AT ALL
GAD7 TOTAL SCORE: 0
2. NOT BEING ABLE TO STOP OR CONTROL WORRYING: NOT AT ALL
1. FEELING NERVOUS, ANXIOUS, OR ON EDGE: NOT AT ALL
3. WORRYING TOO MUCH ABOUT DIFFERENT THINGS: NOT AT ALL
IF YOU CHECKED OFF ANY PROBLEMS ON THIS QUESTIONNAIRE, HOW DIFFICULT HAVE THESE PROBLEMS MADE IT FOR YOU TO DO YOUR WORK, TAKE CARE OF THINGS AT HOME, OR GET ALONG WITH OTHER PEOPLE: NOT DIFFICULT AT ALL
5. BEING SO RESTLESS THAT IT IS HARD TO SIT STILL: NOT AT ALL

## 2021-04-19 ASSESSMENT — MIFFLIN-ST. JEOR: SCORE: 1280.87

## 2021-04-19 ASSESSMENT — PATIENT HEALTH QUESTIONNAIRE - PHQ9
SUM OF ALL RESPONSES TO PHQ QUESTIONS 1-9: 0
5. POOR APPETITE OR OVEREATING: NOT AT ALL

## 2021-04-19 NOTE — PROGRESS NOTES
Rosas is a 40 year old  female who presents for annual exam.     Besides routine health maintenance, she has no other health concerns today .    HPI:  The patient's PCP is [facility].      Patient is doing great. Really let herself go with snacking and alcohol with the start of covid. Her sister nichole was doing optivia and lost 55# and rosas was counting calories and exercising like crazy and couldn't lose. Started it as well and is down about 12-15# and feeling great, tons of energy, etc  Nichole was found to have a really high Vit D but unsure if from drinks or extra supplementing. rosas would like that tested today  Not fasting for labs today but only has had some aptivia shake so far. Last labs were 9/15 but were normal.    Menses were really  Heavy though regular. Went on ocps and that really helped and also realized it really improved her hair loss and thinning. Tried 2x/ to stop ocps, once for 3-4 months and once for 1-2 months to see if periods returned to being bad and to see if still triggered hair loss and both times it did go back to really heavy bleeding and really rapid hair loss/thinning  Now back on ocps for those two things since stevo has had a vas for contraception    marek is 14 and just came out to her. Doing just fine with it and learning and educating herself. Blake is 12 and in 6th and lisa is 11 and in 5th. Now back to inperson school finally      GYNECOLOGIC HISTORY:    Patient's last menstrual period was 2021.    Regular menses? yes  Menses every 28-30 days.  Length of menses: 6 days    Her current contraception method is: oral contraceptives and vasectomy.  She  reports that she has never smoked. She has never used smokeless tobacco.    Patient is sexually active.  STD testing offered?  Declined  Last PHQ-9 score on record =   PHQ-9 SCORE 2021   PHQ-9 Total Score 0     Last GAD7 score on record =   JARAD-7 SCORE 2021   Total Score 0      Alcohol Score = 1    HEALTH MAINTENANCE:  Cholesterol:   Recent Labs   Lab Test 16  1220   CHOL 157   HDL 71   LDL 64   TRIG 108     Last Mammo: Not applicable, Result: Not applicable, Next Mammo: Due at age 42  Pap:   Lab Results   Component Value Date    PAP NIL, HPV- 10/25/2017     Colonoscopy:  N/a, Result: Not applicable, Next Colonoscopy: Age 47.  Dexa:  N/a    Health maintenance updated:  yes    HISTORY:  OB History    Para Term  AB Living   4 3 3 0 1 3   SAB TAB Ectopic Multiple Live Births   1 0 0 0 3      # Outcome Date GA Lbr Yadiel/2nd Weight Sex Delivery Anes PTL Lv   4 Term 01/22/10 41w0d  3.969 kg (8 lb 12 oz) F Vag-Spont EPI  AIDEN      Birth Comments: Followed and delivered by Mao. Was to have cytotec for closed cvx at 41 weeks but spont labor began at 40+4      Name: Heaven   3 Term 08 38w0d  3.6 kg (7 lb 15 oz) M Vag-Spont EPI  AIDEN      Birth Comments: Followed and delivered by Mao      Name: Blake   2 SAB 07 6w0d    SAB      1 Term 06 39w0d  3.459 kg (7 lb 10 oz) F Vag-Spont EPI  AIDEN      Birth Comments: Followed by Jan Cavanaugh and delivered by Sedrick. SROM, Pitocin augmentation      Name: Marissa       Patient Active Problem List   Diagnosis     Menorrhagia with regular cycle     Acne, unspecified acne type     Family history of ischemic heart disease     Past Surgical History:   Procedure Laterality Date     NO HISTORY OF SURGERY        Social History     Tobacco Use     Smoking status: Never Smoker     Smokeless tobacco: Never Used   Substance Use Topics     Alcohol use: Not Currently     Alcohol/week: 0.0 standard drinks     Comment: Occas       Problem (# of Occurrences) Relation (Name,Age of Onset)    Alzheimer Disease (1) Paternal Grandmother    Breast Cancer (1) Mother (58)    C.A.D. (1) Maternal Grandfather:  in 50's of MI    Diabetes (1) Mother: pre-diabetes    Hyperlipidemia (1) Mother    Hypertension (1) Mother    Myocardial  "Infarction (1) Mother (59)            Current Outpatient Medications   Medication Sig     drospirenone-ethinyl estradiol (SARAVANAN) 3-0.03 MG tablet Take 1 tablet by mouth daily     No current facility-administered medications for this visit.      No Known Allergies    Past medical, surgical, social and family histories were reviewed and updated in EPIC.    ROS:   12 point review of systems negative other than symptoms noted below or in the HPI.  No urinary frequency or dysuria, bladder or kidney problems, Normal menstrual cycles    EXAM:  /54   Pulse 80   Ht 1.657 m (5' 5.25\")   Wt 59.6 kg (131 lb 6.4 oz)   LMP 04/13/2021   Breastfeeding No   BMI 21.70 kg/m     BMI: Body mass index is 21.7 kg/m .    PHYSICAL EXAM:  Constitutional:   Appearance: Well nourished, well developed, alert, in no acute distress  Neck:  Lymph Nodes:  No lymphadenopathy present    Thyroid:  Gland size normal, nontender, no nodules or masses present  on palpation  Chest:  Respiratory Effort:  Breathing unlabored  Cardiovascular:    Heart: Auscultation:  Regular rate, normal rhythm, no murmurs present  Breasts: Axillary Lymph Nodes:  No lymphadenopathy present. and SKIN NORMAL W/O LESIONS OR RASHES OR PUCKERING, NORMAL NIPPLES. VERY FIBROCYSTIC IN A SMALL FIRM DIFFUSE MILIARY WAY BILATERALLY  Gastrointestinal:   Abdominal Examination:  Abdomen nontender to palpation, tone normal without rigidity or guarding, no masses present, umbilicus without lesions   Liver and Spleen:  No hepatomegaly present, liver nontender to palpation    Hernias:  No hernias present  Lymphatic: Lymph Nodes:  No other lymphadenopathy present  Skin:  General Inspection:  No rashes present, no lesions present, no areas of  discoloration  Neurologic:    Mental Status:  Oriented X3.  Normal strength and tone, sensory exam                grossly normal, mentation intact and speech normal.    Psychiatric:   Mentation appears normal and affect " normal/bright.         Pelvic Exam:  External Genitalia:     Normal appearance for age, no discharge present, no tenderness present, no inflammatory lesions present, color normal  Vagina:     Normal vaginal vault without central or paravaginal defects, no discharge present, no inflammatory lesions present, no masses present  Bladder:     Nontender to palpation  Urethra:   Urethral Body:  Urethra palpation normal, urethra structural support normal   Urethral Meatus:  No erythema or lesions present  Cervix:     Appearance healthy, no lesions present, nontender to palpation, no bleeding present  Uterus:     Uterus: firm, normal sized and nontender, anteverted in position.   Adnexa:     No adnexal tenderness present, no adnexal masses present  Perineum:     Perineum within normal limits, no evidence of trauma, no rashes or skin lesions present  Anus:     Anus within normal limits, no hemorrhoids present  Inguinal Lymph Nodes:     No lymphadenopathy present  Pubic Hair:     Normal pubic hair distribution for age  Genitalia and Groin:     No rashes present, no lesions present, no areas of discoloration, no masses present      COUNSELING:   Reviewed preventive health counseling, as reflected in patient instructions    BMI: Body mass index is 21.7 kg/m .      ASSESSMENT:  40 year old female with satisfactory annual exam.    ICD-10-CM    1. Encounter for gynecological examination without abnormal finding  Z01.419    2. Menorrhagia with regular cycle  N92.0 drospirenone-ethinyl estradiol (SARAVANAN) 3-0.03 MG tablet   3. Visit for oral contraceptive prescription  Z30.011 drospirenone-ethinyl estradiol (SARAVANAN) 3-0.03 MG tablet   4. History of iron deficiency anemia  Z86.2 CBC with platelets     Ferritin     Iron and iron binding capacity   5. Screening for cardiovascular condition  Z13.6 Lipid panel reflex to direct LDL Fasting   6. Screening for metabolic disorder  Z13.228 Comprehensive metabolic panel   7. Screening for  thyroid disorder  Z13.29 TSH with free T4 reflex   8. Encounter for vitamin deficiency screening  Z13.21 Vitamin D Deficiency   9. Screening for disorder of blood and blood-forming organs  Z13.0 CBC with platelets   10. Hair loss  L65.9 drospirenone-ethinyl estradiol (SARAVANAN) 3-0.03 MG tablet       PLAN:  Pap is UTD for 1.5 more years  mammo to start at age 42    Discussed pros/cons of ocps at her age and the risks of VTE and cancer and do feel her risks are quite small  Continue on ocps for cycle control, h.o iron deficiency, and hair loss.  Refill on saravanan sent to the pharmacy for the year    Screening labs today along with D and iron studies    Alfreda Cavanaugh MD

## 2021-04-20 LAB — DEPRECATED CALCIDIOL+CALCIFEROL SERPL-MC: 112 UG/L (ref 20–75)

## 2021-04-20 ASSESSMENT — ANXIETY QUESTIONNAIRES: GAD7 TOTAL SCORE: 0

## 2021-09-25 ENCOUNTER — HEALTH MAINTENANCE LETTER (OUTPATIENT)
Age: 41
End: 2021-09-25

## 2022-05-04 ENCOUNTER — TELEPHONE (OUTPATIENT)
Dept: OBGYN | Facility: CLINIC | Age: 42
End: 2022-05-04
Payer: COMMERCIAL

## 2022-05-04 DIAGNOSIS — Z30.011 VISIT FOR ORAL CONTRACEPTIVE PRESCRIPTION: ICD-10-CM

## 2022-05-04 DIAGNOSIS — L65.9 HAIR LOSS: ICD-10-CM

## 2022-05-04 DIAGNOSIS — N92.0 MENORRHAGIA WITH REGULAR CYCLE: ICD-10-CM

## 2022-05-04 RX ORDER — DROSPIRENONE AND ETHINYL ESTRADIOL 0.03MG-3MG
1 KIT ORAL DAILY
Qty: 84 TABLET | Refills: 0 | Status: SHIPPED | OUTPATIENT
Start: 2022-05-04 | End: 2022-07-22

## 2022-05-04 NOTE — TELEPHONE ENCOUNTER
Patient just scheduled her Annual & Mammo in July. She is almost out of her BC and would like us to call it in to her [facility] today so she can pick it up on Friday

## 2022-05-04 NOTE — TELEPHONE ENCOUNTER
Requested Prescriptions   Signed Prescriptions Disp Refills    drospirenone-ethinyl estradiol (SARAVANAN) 3-0.03 MG tablet 84 tablet 0     Sig: Take 1 tablet by mouth daily       There is no refill protocol information for this order        Last filled:4/19/21  Next appt:7/22/22    Sonia Walters RN

## 2022-07-02 ENCOUNTER — HEALTH MAINTENANCE LETTER (OUTPATIENT)
Age: 42
End: 2022-07-02

## 2022-07-22 ENCOUNTER — ANCILLARY PROCEDURE (OUTPATIENT)
Dept: MAMMOGRAPHY | Facility: CLINIC | Age: 42
End: 2022-07-22
Payer: COMMERCIAL

## 2022-07-22 ENCOUNTER — OFFICE VISIT (OUTPATIENT)
Dept: OBGYN | Facility: CLINIC | Age: 42
End: 2022-07-22

## 2022-07-22 VITALS
BODY MASS INDEX: 23.99 KG/M2 | SYSTOLIC BLOOD PRESSURE: 118 MMHG | HEIGHT: 65 IN | WEIGHT: 144 LBS | DIASTOLIC BLOOD PRESSURE: 70 MMHG

## 2022-07-22 DIAGNOSIS — L65.9 HAIR LOSS: ICD-10-CM

## 2022-07-22 DIAGNOSIS — Z30.011 VISIT FOR ORAL CONTRACEPTIVE PRESCRIPTION: ICD-10-CM

## 2022-07-22 DIAGNOSIS — Z12.31 VISIT FOR SCREENING MAMMOGRAM: ICD-10-CM

## 2022-07-22 DIAGNOSIS — N92.0 MENORRHAGIA WITH REGULAR CYCLE: ICD-10-CM

## 2022-07-22 DIAGNOSIS — Z01.419 ENCOUNTER FOR GYNECOLOGICAL EXAMINATION WITHOUT ABNORMAL FINDING: Primary | ICD-10-CM

## 2022-07-22 PROCEDURE — 77067 SCR MAMMO BI INCL CAD: CPT | Mod: TC | Performed by: RADIOLOGY

## 2022-07-22 PROCEDURE — 77063 BREAST TOMOSYNTHESIS BI: CPT | Mod: TC | Performed by: RADIOLOGY

## 2022-07-22 PROCEDURE — 99396 PREV VISIT EST AGE 40-64: CPT | Performed by: OBSTETRICS & GYNECOLOGY

## 2022-07-22 PROCEDURE — G0145 SCR C/V CYTO,THINLAYER,RESCR: HCPCS | Performed by: OBSTETRICS & GYNECOLOGY

## 2022-07-22 PROCEDURE — 87624 HPV HI-RISK TYP POOLED RSLT: CPT | Performed by: OBSTETRICS & GYNECOLOGY

## 2022-07-22 RX ORDER — MULTIPLE VITAMINS W/ MINERALS TAB 9MG-400MCG
1 TAB ORAL DAILY
COMMUNITY

## 2022-07-22 RX ORDER — DROSPIRENONE AND ETHINYL ESTRADIOL 0.03MG-3MG
1 KIT ORAL DAILY
Qty: 84 TABLET | Refills: 4 | Status: CANCELLED | OUTPATIENT
Start: 2022-07-22

## 2022-07-22 RX ORDER — MULTIVITAMIN WITH IRON
1 TABLET ORAL DAILY
COMMUNITY
End: 2023-11-27

## 2022-07-22 RX ORDER — DROSPIRENONE AND ETHINYL ESTRADIOL 0.03MG-3MG
1 KIT ORAL DAILY
Qty: 84 TABLET | Refills: 4 | Status: SHIPPED | OUTPATIENT
Start: 2022-07-22 | End: 2023-09-21

## 2022-07-22 ASSESSMENT — PATIENT HEALTH QUESTIONNAIRE - PHQ9
5. POOR APPETITE OR OVEREATING: NOT AT ALL
SUM OF ALL RESPONSES TO PHQ QUESTIONS 1-9: 0

## 2022-07-22 ASSESSMENT — ANXIETY QUESTIONNAIRES
5. BEING SO RESTLESS THAT IT IS HARD TO SIT STILL: NOT AT ALL
2. NOT BEING ABLE TO STOP OR CONTROL WORRYING: NOT AT ALL
GAD7 TOTAL SCORE: 0
6. BECOMING EASILY ANNOYED OR IRRITABLE: NOT AT ALL
3. WORRYING TOO MUCH ABOUT DIFFERENT THINGS: NOT AT ALL
GAD7 TOTAL SCORE: 0
1. FEELING NERVOUS, ANXIOUS, OR ON EDGE: NOT AT ALL
7. FEELING AFRAID AS IF SOMETHING AWFUL MIGHT HAPPEN: NOT AT ALL

## 2022-07-22 NOTE — PROGRESS NOTES
Shayna is a 42 year old  female who presents for annual exam.     Besides routine health maintenance, she has no other health concerns today.    HPI:  The patient's PCP is [facility].      Patient says that she has regular periods w/ her birth ctrl pills and will occasionally take her ocps back to back and will get away w/ some light break thru spotting rather than a full period if has a vacation or something. Otherwise will just take them the normal cyclic way. Found that when she stopped her ocps she noticed her hair thinning almost immediately and so is now just planning to stay on ocps and wondering about long term and safety, etc    She says that before going on birth ctrl pill she had really bad PMS and bloating and breast pain and acne. None of those sx  Are present when takes her pill. no vasomotor sx. Notes that her mom began menopause late 40's.      Patient states that she had really low libido and she was not sexually active with stevo hardly at all. Went on a girls trip where all her friends were discussing sex life being poor but a couple recommitting to focusing on it and libido came back and relationship much better. Came home and told stevo they were committing to 2x/week no matter what and have been consistent with that and now has a great libido and definitely helps their relationship.      Had been working with her sister on weight loss with yareli. Sis had more to lose and lost 60# and has kept a lot of it off. She lost about 20# and has gained some of it back but not all and is ok with that and just trying to be healthier and active, etc    Blake is 13 yrs old and going into tackle football this yr. Both daughters are taller than her now, and Marissa is doing drivers ed.      One year ago had normal fasting labs.     Had original covid vaxx series but not booster and declines it    GYNECOLOGIC HISTORY:    No LMP recorded (lmp unknown).    Regular menses?  yes  Menses every 28 days.  Length of menses: 5 days    Her current contraception method is: vasectomy.  She  reports that she has never smoked. She has never used smokeless tobacco.    Patient is sexually active.  STD testing offered?  Declined     Last PHQ-9 score on record =   PHQ-9 SCORE 2022   PHQ-9 Total Score 0     Last GAD7 score on record =   JARAD-7 SCORE 2022   Total Score 0     Alcohol Score = 1    HEALTH MAINTENANCE:  Cholesterol:   Recent Labs   Lab Test 21  1350 16  1220   CHOL 218* 157   HDL 80 71   * 64   TRIG 90 108     TSH   Date Value Ref Range Status   2021 1.43 0.40 - 4.00 mU/L Final     Last Mammo: Not applicable, Next Mammo: Today   Pap:   Lab Results   Component Value Date    PAP NIL  10/25/2017      Colonoscopy: N/A, Next Colonoscopy: Due at age 47.  Dexa:  N/A    Health maintenance updated:  yes    HISTORY:  OB History    Para Term  AB Living   4 3 3 0 1 3   SAB IAB Ectopic Multiple Live Births   1 0 0 0 3      # Outcome Date GA Lbr Yadiel/2nd Weight Sex Delivery Anes PTL Lv   4 Term 01/22/10 41w0d  3.969 kg (8 lb 12 oz) F Vag-Spont EPI  AIDEN      Birth Comments: Followed and delivered by Mao. Was to have cytotec for closed cvx at 41 weeks but spont labor began at 40+4      Name: Heaven   3 Term 08 38w0d  3.6 kg (7 lb 15 oz) M Vag-Spont EPI  AIDEN      Birth Comments: Followed and delivered by Mao      Name: Blake   2 SAB 07 6w0d    SAB      1 Term 06 39w0d  3.459 kg (7 lb 10 oz) F Vag-Spont EPI  AIDEN      Birth Comments: Followed by Jan Cavanaugh and delivered by Sedrick. SROM, Pitocin augmentation      Name: Marissa       Patient Active Problem List   Diagnosis     Menorrhagia with regular cycle     Acne, unspecified acne type     Family history of ischemic heart disease     Past Surgical History:   Procedure Laterality Date     NO HISTORY OF SURGERY        Social History     Tobacco Use     Smoking status: Never  "Smoker     Smokeless tobacco: Never Used   Substance Use Topics     Alcohol use: Not Currently     Alcohol/week: 0.0 standard drinks     Comment: Occas       Problem (# of Occurrences) Relation (Name,Age of Onset)    Alzheimer Disease (1) Paternal Grandmother    Breast Cancer (1) Mother (58)    C.A.D. (1) Maternal Grandfather:  in 50's of MI    Diabetes (1) Mother: pre-diabetes    Hyperlipidemia (1) Mother    Hypertension (1) Mother    Myocardial Infarction (1) Mother (59)            Current Outpatient Medications   Medication Sig     drospirenone-ethinyl estradiol (SARAVANAN) 3-0.03 MG tablet Take 1 tablet by mouth daily     magnesium 250 MG tablet Take 1 tablet by mouth daily     multivitamin w/minerals (MULTI-VITAMIN) tablet Take 1 tablet by mouth daily     No current facility-administered medications for this visit.     No Known Allergies    Past medical, surgical, social and family histories were reviewed and updated in EPIC.    ROS:   12 point review of systems negative other than symptoms noted below or in the HPI.  No urinary frequency or dysuria, bladder or kidney problems, Normal menstrual cycles    EXAM:  /70   Ht 1.657 m (5' 5.25\")   Wt 65.3 kg (144 lb)   LMP  (LMP Unknown)   BMI 23.78 kg/m     BMI: Body mass index is 23.78 kg/m .    PHYSICAL EXAM:  Constitutional:   Appearance: Well nourished, well developed, alert, in no acute distress  Neck:  Lymph Nodes:  No lymphadenopathy present    Thyroid:  Gland size normal, nontender, no nodules or masses present  on palpation  Chest:  Respiratory Effort:  Breathing unlabored, CTAB  Cardiovascular:    Heart: Auscultation:  Regular rate, normal rhythm, no murmurs present  Breasts: Inspection of Breasts:  No lymphadenopathy present., Palpation of Breasts and Axillae:  No masses present on palpation, no breast tenderness., Axillary Lymph Nodes:  No lymphadenopathy present. and No nodularity, asymmetry or nipple discharge " bilaterally.  Gastrointestinal:   Abdominal Examination:  Abdomen nontender to palpation, tone normal without rigidity or guarding, no masses present, umbilicus without lesions   Liver and Spleen:  No hepatomegaly present, liver nontender to palpation    Hernias:  No hernias present  Lymphatic: Lymph Nodes:  No other lymphadenopathy present  Skin:  General Inspection:  No rashes present, no lesions present, no areas of  discoloration  Neurologic:    Mental Status:  Oriented X3.  Normal strength and tone, sensory exam                grossly normal, mentation intact and speech normal.    Psychiatric:   Mentation appears normal and affect normal/bright.         Pelvic Exam:  External Genitalia:     Normal appearance for age, no discharge present, no tenderness present, no inflammatory lesions present, color normal  Vagina:     Normal vaginal vault without central or paravaginal defects, CLEAR MUCUS present, no inflammatory lesions present, no masses present  Bladder:     Nontender to palpation  Urethra:   Urethral Body:  Urethra palpation normal, urethra structural support normal   Urethral Meatus:  No erythema or lesions present  Cervix:     Appearance healthy, no lesions present, nontender to palpation, no bleeding present  Uterus:     Uterus: firm, normal sized and nontender, anteverted in position.   Adnexa:     No adnexal tenderness present, no adnexal masses present  Perineum:     Perineum within normal limits, no evidence of trauma, no rashes or skin lesions present  Anus:     Anus within normal limits, no hemorrhoids present  Inguinal Lymph Nodes:     No lymphadenopathy present  Pubic Hair:     Normal pubic hair distribution for age  Genitalia and Groin:     No rashes present, no lesions present, no areas of discoloration, no masses present      COUNSELING:   Reviewed preventive health counseling, as reflected in patient instructions  Special attention given to:        Contraception    BMI: Body mass index is  23.78 kg/m .      ASSESSMENT:  42 year old female with satisfactory annual exam.    ICD-10-CM    1. Encounter for gynecological examination without abnormal finding  Z01.419 Pap screen with HPV - recommended age 30 - 65 years     HPV Hold (Lab Only)     HPV High Risk Types DNA Cervical   2. Menorrhagia with regular cycle  N92.0 drospirenone-ethinyl estradiol (SARAVANAN) 3-0.03 MG tablet   3. Visit for oral contraceptive prescription  Z30.011 drospirenone-ethinyl estradiol (SARAVANAN) 3-0.03 MG tablet   4. Hair loss  L65.9 drospirenone-ethinyl estradiol (SARAVANAN) 3-0.03 MG tablet       PLAN:  Pap screen w/ HPV done today.   mammo for the first time today    Refills on birth ctrl pills sent to pharmacy for the year  Discussed pros/cons and risks. Patient has no contraindication to continuing on ocps long term until age of menopause and then transitioning at that time.  If new contraindications arise will discuss at that time.    Should repeat fasting labs every 3 yrs or so    Denies any current issues with prolapse/cystocele/rectocele    Reviewed indications for covid booster and the pros/cons and natural immunity from infection in past, etc.  Patient declines for now and may want to get it in the fall once new strains are added to it          Alfreda Cavanaugh MD

## 2022-07-27 LAB
BKR LAB AP GYN ADEQUACY: NORMAL
BKR LAB AP GYN INTERPRETATION: NORMAL
BKR LAB AP HPV REFLEX: NORMAL
BKR LAB AP PREVIOUS ABNORMAL: NORMAL
PATH REPORT.COMMENTS IMP SPEC: NORMAL
PATH REPORT.COMMENTS IMP SPEC: NORMAL
PATH REPORT.RELEVANT HX SPEC: NORMAL

## 2022-07-28 LAB
HUMAN PAPILLOMA VIRUS 16 DNA: NEGATIVE
HUMAN PAPILLOMA VIRUS 18 DNA: NEGATIVE
HUMAN PAPILLOMA VIRUS FINAL DIAGNOSIS: NORMAL
HUMAN PAPILLOMA VIRUS OTHER HR: NEGATIVE

## 2022-12-15 ENCOUNTER — TELEPHONE (OUTPATIENT)
Dept: OBGYN | Facility: CLINIC | Age: 42
End: 2022-12-15

## 2022-12-15 DIAGNOSIS — R35.0 URINARY FREQUENCY: Primary | ICD-10-CM

## 2022-12-15 NOTE — TELEPHONE ENCOUNTER
Having some issues over the last few months with feeling like her bladder is always full.    Does feel like she is able to empty her bladder but will still feel like she has a full bladder when she shouldn't have one.     Now it has started happening at night where she will wake up 3-4 times with the feeling of having a full bladder for no reason as she limits fluids after 8pm    Does not have any further symptoms   Not having any incontinence    Iva Odonnell RN on 12/15/2022 at 10:18 AM

## 2022-12-16 NOTE — TELEPHONE ENCOUNTER
Called pt to relay message from Dr. Cavanaugh  Informed her to schedule a lab only visit for a UA/UC and then a follow up phone visit with Dr. Cavanaugh.   Pt verbalized understanding, in agreement with plan, and voiced no further questions.  Transferred to scheduling for appt planning  Rosa M Medina RN on 12/16/2022 at 9:06 AM

## 2022-12-19 ENCOUNTER — LAB (OUTPATIENT)
Dept: LAB | Facility: CLINIC | Age: 42
End: 2022-12-19
Payer: COMMERCIAL

## 2022-12-19 DIAGNOSIS — R35.0 URINARY FREQUENCY: ICD-10-CM

## 2022-12-19 LAB
ALBUMIN UR-MCNC: NEGATIVE MG/DL
APPEARANCE UR: CLEAR
BILIRUB UR QL STRIP: NEGATIVE
COLOR UR AUTO: YELLOW
GLUCOSE UR STRIP-MCNC: NEGATIVE MG/DL
HGB UR QL STRIP: NEGATIVE
KETONES UR STRIP-MCNC: NEGATIVE MG/DL
LEUKOCYTE ESTERASE UR QL STRIP: NEGATIVE
NITRATE UR QL: NEGATIVE
PH UR STRIP: 5.5 [PH] (ref 5–7)
RBC #/AREA URNS AUTO: NORMAL /HPF
SP GR UR STRIP: 1.01 (ref 1–1.03)
UROBILINOGEN UR STRIP-ACNC: 0.2 E.U./DL
WBC #/AREA URNS AUTO: NORMAL /HPF

## 2022-12-19 PROCEDURE — 87086 URINE CULTURE/COLONY COUNT: CPT

## 2022-12-19 PROCEDURE — 81001 URINALYSIS AUTO W/SCOPE: CPT

## 2022-12-21 LAB — BACTERIA UR CULT: NORMAL

## 2022-12-21 NOTE — RESULT ENCOUNTER NOTE
Shayna,    Your urine analysis and culture all look completely normal. No infection/UTI or any other concerning findings.    Alfreda Cavanaugh MD

## 2022-12-26 ENCOUNTER — HEALTH MAINTENANCE LETTER (OUTPATIENT)
Age: 42
End: 2022-12-26

## 2022-12-29 NOTE — PROGRESS NOTES
"Shayna Dougherty is a 42 year old female who is being evaluated via a billable telephone visit.      What phone number would you like to be contacted at? [phone]  How would you like to obtain your AVS? Courtney    Originating Location (pt. Location): [facility]       Distant Location (provider location):  On-site    SUBJECTIVE:                                                   Shayna Dougherty is a 42 year old female who presents for virtual visit today for the following health issue(s):  Patient presents with:  Follow Up: Discuss urine results, frequent urge to urinate and nocturia. Symptoms began 2 weeks ago and feels they are starting to improve but have not fully resolved. Getting up 2x a night now to urinate, was up 4-5x a night.    HPI:    Patient is having a virtual visit by phone to discuss urinary symptoms and recent UA/Ucx  Sx began two years ago where had more nocturia. Happened when started aptivia and increased her po fluids to at least 64 oz a day. But then it was maybe 1-2x/night and not more than that.  However then stopped doing aptivia but still trying to drink more fluids. About 2-3 weeks ago it acutely worsened to 4-5x/night  Cut out fluid of any kind after 7pm and didn't seem to help  Not sure what a \"good volume\" is but did feel like when she went there was a normal and reasonable amount of urine there not just severe urgency to wake her and then barely any volume    In the more recent 2 weeks its back to 2x/night but still waking her and the pressure is there enough that can't get back to sleep once she wakes up so tries to wait and see if just a passing urge but isn't. Seems like still a reasonable amount of urine present but not as much.  When happening 4-5x/night would sometimes be even 45 minutes later and then wasn't always quite as much.      Denies changes in vitamins or supplements. Denies any vaginal itching or abnormal discharge or odor. No fevers, chills, flank pain or dysuria. " "No blood in the urine, no constipation. Periods are normal on ocp      Patient had some pelvic floor laxity on exam the last few years with some cystocele noted and slightly rectocele.  She states she really \"has no control down there anymore\"  \"used to be able to squeeze and stop herself when got the urge to go and now can't at all\"    Denies prolapse, bulge, significant pressure. No obvious sensation of worsening prolapse.  Not having much RUDDY but can have some UI/OI  During the day is frankly going to the bathroom fairly often as well but that has been for a long time and it wasn't until the nocturia worsened that she thought it may be an issue    UA/UCx done a few days ago was completely normal    Patient's last menstrual period was 2022.    Patient is sexually active, .  Using vasectomy for contraception.    reports that she has never smoked. She has never used smokeless tobacco.    Health maintenance updated:  yes    Today's PHQ-2 Score:   PHQ-2 (  [employer]) 2022   Q1: Little interest or pleasure in doing things 0   Q2: Feeling down, depressed or hopeless 0   PHQ-2 Score 0   PHQ-2 Total Score (12-17 Years)- Positive if 3 or more points; Administer PHQ-A if positive -     Today's PHQ-9 Score:   PHQ-9 SCORE 2022   PHQ-9 Total Score 0     Today's JARAD-7 Score:   JARAD-7 SCORE 2022   Total Score 0       Problem list and histories reviewed & adjusted, as indicated.  Additional history: as documented.    Patient Active Problem List   Diagnosis     Menorrhagia with regular cycle     Acne, unspecified acne type     Family history of ischemic heart disease     Past Surgical History:   Procedure Laterality Date     NO HISTORY OF SURGERY        Social History     Tobacco Use     Smoking status: Never     Smokeless tobacco: Never   Substance Use Topics     Alcohol use: Not Currently     Alcohol/week: 0.0 standard drinks     Comment: Occas       Problem (# of Occurrences) Relation (Name,Age of " Onset)    Alzheimer Disease (1) Paternal Grandmother    Diabetes (1) Mother: pre-diabetes    Hypertension (1) Mother    Breast Cancer (1) Mother (58)    C.A.D. (1) Maternal Grandfather:  in 50's of MI    Myocardial Infarction (1) Mother (59)    Hyperlipidemia (1) Mother            Current Outpatient Medications   Medication Sig     drospirenone-ethinyl estradiol (SARAVANAN) 3-0.03 MG tablet Take 1 tablet by mouth daily     magnesium 250 MG tablet Take 1 tablet by mouth daily     multivitamin w/minerals (THERA-VIT-M) tablet Take 1 tablet by mouth daily     No current facility-administered medications for this visit.     No Known Allergies      OBJECTIVE:     No vitals were obtained today due to virtual visit.    Physical Exam      PSYCH: Mentation appears normal, affect normal/bright, judgement and insight intact, normal speech   ASSESSMENT/PLAN:                                                      Phone call duration: 20 minutes along with 6 minutes of chart prep and review, and chart completion, on the same DOS, for a total of 26 minutes      ICD-10-CM    1. Urinary urgency  R39.15 Physical Therapy Referral      2. Urinary frequency  R35.0 Physical Therapy Referral      3. Nocturia  R35.1 Physical Therapy Referral      4. Cystocele, midline  N81.11 Physical Therapy Referral          Patient with acute onset worsening frequent nocturia and sleep disruption on top of her long standing frequency during the day with likely some degree of OAB.  However no si/sx of UTI or vaginal infection. Unlikely to have pelvic mass or ovarian cyst that is pushing on bladder and causing this as no pain and on ocps which should be suppressive to that    When good/high volume is voided then that means there was just that level of fluid mobilization from intake/hydration, but just mobilized at night more when laying down and legs elevated, etc.  When more frequency and urgency and low volume then that can be detrusor  instability/spasm/etc    Timing of fluid intake is partly going to help but overall fluid balance in the system is more important and so mobilization at night even if stops drinking at 6-7pm may help. Leg elevation before bed for a while to help mobilize sooner so not sleep disruptive is an option, discussed antimuscarinic meds and the pros/cons of them, avoiding bladder irritants like caffeine, alcohol and advised to look online for other triggers like some berries, nuts, etc    Discussed pelvic floor laxity and cystocele and how that may impact this as well, though the acute worsening likely wasn't just related to this.  Discussed home self directed kegels vs pelvic floor PT and recommend the latter since she herself notices that she doesn't have the strength or ability to kegel anymore since having kids.  Referral placed and will either be called or can find number in AVS on mychart and set up visit    Discussed other surgical options like cystocele repair, pessary, etc but this sounds to be multifactorial and just surgical repair for more anatomically appropriate pelvic floor support is not likely enough.    Unless sx again worsen, will follow up with me this summer, at time of her annual, to see how the pelvic floor therapy is going and if any improvements or not and then can determine next appropriate steps    Alfreda Cavanaugh MD  [facility] [place]

## 2022-12-30 ENCOUNTER — VIRTUAL VISIT (OUTPATIENT)
Dept: OBGYN | Facility: CLINIC | Age: 42
End: 2022-12-30
Payer: COMMERCIAL

## 2022-12-30 DIAGNOSIS — R39.15 URINARY URGENCY: Primary | ICD-10-CM

## 2022-12-30 DIAGNOSIS — N81.11 CYSTOCELE, MIDLINE: ICD-10-CM

## 2022-12-30 DIAGNOSIS — R35.0 URINARY FREQUENCY: ICD-10-CM

## 2022-12-30 DIAGNOSIS — R35.1 NOCTURIA: ICD-10-CM

## 2022-12-30 PROCEDURE — 99213 OFFICE O/P EST LOW 20 MIN: CPT | Mod: 95 | Performed by: OBSTETRICS & GYNECOLOGY

## 2023-02-17 ENCOUNTER — THERAPY VISIT (OUTPATIENT)
Dept: PHYSICAL THERAPY | Facility: CLINIC | Age: 43
End: 2023-02-17
Attending: OBSTETRICS & GYNECOLOGY
Payer: COMMERCIAL

## 2023-02-17 DIAGNOSIS — R39.15 URINARY URGENCY: ICD-10-CM

## 2023-02-17 DIAGNOSIS — N81.11 CYSTOCELE, MIDLINE: ICD-10-CM

## 2023-02-17 DIAGNOSIS — R35.0 URINARY FREQUENCY: ICD-10-CM

## 2023-02-17 DIAGNOSIS — R35.1 NOCTURIA: ICD-10-CM

## 2023-02-17 PROCEDURE — 97535 SELF CARE MNGMENT TRAINING: CPT | Mod: GP | Performed by: PHYSICAL THERAPIST

## 2023-02-17 PROCEDURE — 97161 PT EVAL LOW COMPLEX 20 MIN: CPT | Mod: GP | Performed by: PHYSICAL THERAPIST

## 2023-02-17 PROCEDURE — 97110 THERAPEUTIC EXERCISES: CPT | Mod: GP | Performed by: PHYSICAL THERAPIST

## 2023-02-17 NOTE — PROGRESS NOTES
Physical Therapy Initial Evaluation  Subjective:  SUBJECTIVE:    Patient reports onset of symptoms over the last few months.    Symptoms include nocturia.  Getting up overnight multiple times per night, not leaking or having issues with urgency during the day. Youngest kid is 13, generally would sleep through the night. Has been working on diet and eating more fiber/whole foods.   Since onset symptoms have been getting better, worse or staying the same? Getting worse.      Urination:  Do you leak on the way to the bathroom or with a strong urge to void? No    Do you leak with cough,sneeze, jumping, running? No   Any other activities that cause leaking? no  Do you have triggers that make you feel you can't wait to go to the bathroom? No   How often do you typically void? 1-2 hours     How long can you delay the need to urinate? 31 - 60 minutes.   How many times do you get up to urinate at night? 13 times   Can you stop the flow of urine when on the toilet? Yes  Is the volume of urine passed usually: medium. (8sec rule=  250ml with average bladder storing  400-600ml)    Do you strain to pass urine? No  Do you have a slow or hesitant urinary stream? Yes, sometimes   Do you have difficulty initiating the urine stream? No    How many bladder infections have you had in last 12 months? 0    Fluid intake (one glass is 8oz or one cup) 8 glasses/day  1 caffinated glasses/day  0 alcohol glasses/day.    Bowel habits:  Frequency of bowel movements? 5x per week, more frequently now with diet   Consistancy of stool? soft formed, hard  Do you ignore the urge to defecate? No  Do you strain to pass stool? sometimes (feels like more straining due to hemmroids)     Pelvic Pain:  When do you have pelvic pain? no  Are you sexually active? Yes  Is initial penetration during intercourse painful? No  Is deeper penetration painful? Sometimes (can feel like it hits something, rarely happens)   Do you use lubricant? Yes, water based     Birth  History:   Given birth: Yes   Complications: babies close together   Vaginal deliveries:  3  C-sections:  0  Episiotomies: 0  Have you ever been worried for your physical safety? No  History of abdominal or pelvic surgeries? no    Regular exercise: No, roller coaster exerciser (yoga, treadmill, weightlifting)   Practiced the PF(kegel) exercises for 4 or more weeks? no      GOALS: to reduce nighttime wake ups               The history is provided by the patient.   Patient Health History  Shayna Dougherty being seen for nocturia .     Problem began: 12/30/2022.      Pain is reported as 0/10 on pain scale.  General health as reported by patient is good.  Pertinent medical history includes: none.     Medical allergies: none.   Surgeries include:  None.    Current medications:  None.    Current occupation is Realtor .   Primary job tasks include:  Computer work and driving.                                    Objective:  System                                 Pelvic Dysfunction Evaluation:    Bladder/Pelvic Problems:    Storage Problem:  Nocturia            Abdominal Wall:  normal  Diastasis Recti:  Normal              Internal Assessment:  Internal assessment pelvic: declines pelvic exam today               Additional History:  Delivery History:  Vaginal delivery    Number of Live Births: 3                       General     ROS    Assessment/Plan:    Patient is a 42 year old female with pelvic complaints.    Patient has the following significant findings with corresponding treatment plan.                Diagnosis 1:  Nocturia  Decreased strength - therapeutic exercise, therapeutic activities and home program  Impaired muscle performance - neuro re-education and home program  Decreased function - therapeutic activities and home program    Cumulative Therapy Evaluation is: Low complexity.    Previous and current functional limitations:  (See Goal Flow Sheet for this information)    Short term and Long term goals: (See Goal  Flow Sheet for this information)     Communication ability:  Patient appears to be able to clearly communicate and understand verbal and written communication and follow directions correctly.  Treatment Explanation - The following has been discussed with the patient:   RX ordered/plan of care  Anticipated outcomes  Possible risks and side effects  This patient would benefit from PT intervention to resume normal activities.   Rehab potential is good.    Frequency:  2 X a month, once daily  Duration:  for 2 months  Discharge Plan:  Achieve all LTG.  Independent in home treatment program.  Reach maximal therapeutic benefit.    Please refer to the daily flowsheet for treatment today, total treatment time and time spent performing 1:1 timed codes.

## 2023-09-17 ENCOUNTER — HEALTH MAINTENANCE LETTER (OUTPATIENT)
Age: 43
End: 2023-09-17

## 2023-09-21 DIAGNOSIS — L65.9 HAIR LOSS: ICD-10-CM

## 2023-09-21 DIAGNOSIS — N92.0 MENORRHAGIA WITH REGULAR CYCLE: ICD-10-CM

## 2023-09-21 DIAGNOSIS — Z30.011 VISIT FOR ORAL CONTRACEPTIVE PRESCRIPTION: ICD-10-CM

## 2023-09-21 RX ORDER — DROSPIRENONE AND ETHINYL ESTRADIOL 0.03MG-3MG
1 KIT ORAL DAILY
Qty: 84 TABLET | Refills: 0 | Status: SHIPPED | OUTPATIENT
Start: 2023-09-21 | End: 2023-11-27

## 2023-09-21 NOTE — TELEPHONE ENCOUNTER
Reason for call:  Medication   If this is a refill request, has the caller requested the refill from the pharmacy already? No  Will the patient be using a [facility]? No  Name of the pharmacy and phone number for the current request: [facility] [place], [place] - [address]    Name of the medication requested:  drospirenone-ethinyl estradiol (SARAVANAN) 3-0.03 MG tablet    Other request: Annual is scheduled on 11/27/2023, needs refill to get her through to that date at least.    Phone number to reach patient:  Cell number on file:    Telephone Information:   Mobile [phone]     Best Time:  any    Can we leave a detailed message on this number?  YES    Travel screening: Not Applicable

## 2023-09-21 NOTE — CONFIDENTIAL NOTE
"Requested Prescriptions   Pending Prescriptions Disp Refills    drospirenone-ethinyl estradiol (SARAVANAN) 3-0.03 MG tablet 84 tablet 0     Sig: Take 1 tablet by mouth daily       Contraceptives Protocol Passed - 9/21/2023 11:31 AM        Passed - Patient is not a current smoker if age is 35 or older        Passed - Recent (12 mo) or future (30 days) visit within the authorizing provider's specialty     Patient has had an office visit with the authorizing provider or a provider within the authorizing providers department within the previous 12 mos or has a future within next 30 days. See \"Patient Info\" tab in inbasket, or \"Choose Columns\" in Meds & Orders section of the refill encounter.              Passed - Medication is active on med list        Passed - No active pregnancy on record        Passed - No positive pregnancy test in past 12 months           Last Written Prescription Date:  7/22/22  Last Fill Quantity: 84,  # refills: 4   Last office visit: 7/22/2022 ; last virtual visit: 12/30/2022 with prescribing provider:  Dr Cavanaugh   Future Office Visit:    11/27/2023    Prescription approved per [facility] Refill Protocol.  Sarah Cannon RN on 9/21/2023 at 11:37 AM          "

## 2023-11-27 ENCOUNTER — OFFICE VISIT (OUTPATIENT)
Dept: OBGYN | Facility: CLINIC | Age: 43
End: 2023-11-27
Payer: COMMERCIAL

## 2023-11-27 ENCOUNTER — ANCILLARY PROCEDURE (OUTPATIENT)
Dept: MAMMOGRAPHY | Facility: CLINIC | Age: 43
End: 2023-11-27
Payer: COMMERCIAL

## 2023-11-27 VITALS
HEIGHT: 66 IN | WEIGHT: 153.8 LBS | SYSTOLIC BLOOD PRESSURE: 98 MMHG | BODY MASS INDEX: 24.72 KG/M2 | DIASTOLIC BLOOD PRESSURE: 60 MMHG

## 2023-11-27 DIAGNOSIS — Z11.59 ENCOUNTER FOR HEPATITIS C SCREENING TEST FOR LOW RISK PATIENT: ICD-10-CM

## 2023-11-27 DIAGNOSIS — Z12.31 VISIT FOR SCREENING MAMMOGRAM: ICD-10-CM

## 2023-11-27 DIAGNOSIS — Z82.49 FAMILY HISTORY OF HEART DISEASE: ICD-10-CM

## 2023-11-27 DIAGNOSIS — Z13.0 SCREENING FOR DISORDER OF BLOOD AND BLOOD-FORMING ORGANS: ICD-10-CM

## 2023-11-27 DIAGNOSIS — Z13.6 SCREENING FOR CARDIOVASCULAR CONDITION: ICD-10-CM

## 2023-11-27 DIAGNOSIS — Z01.419 ENCOUNTER FOR GYNECOLOGICAL EXAMINATION WITHOUT ABNORMAL FINDING: Primary | ICD-10-CM

## 2023-11-27 DIAGNOSIS — Z13.228 SCREENING FOR METABOLIC DISORDER: ICD-10-CM

## 2023-11-27 DIAGNOSIS — Z13.21 ENCOUNTER FOR VITAMIN DEFICIENCY SCREENING: ICD-10-CM

## 2023-11-27 DIAGNOSIS — L65.9 HAIR LOSS: ICD-10-CM

## 2023-11-27 DIAGNOSIS — Z13.29 SCREENING FOR THYROID DISORDER: ICD-10-CM

## 2023-11-27 DIAGNOSIS — R79.82 ELEVATED C-REACTIVE PROTEIN (CRP): ICD-10-CM

## 2023-11-27 DIAGNOSIS — Z13.1 SCREENING FOR DIABETES MELLITUS: ICD-10-CM

## 2023-11-27 DIAGNOSIS — N92.0 MENORRHAGIA WITH REGULAR CYCLE: ICD-10-CM

## 2023-11-27 DIAGNOSIS — Z30.011 VISIT FOR ORAL CONTRACEPTIVE PRESCRIPTION: ICD-10-CM

## 2023-11-27 DIAGNOSIS — Z11.4 SCREENING FOR HIV (HUMAN IMMUNODEFICIENCY VIRUS): ICD-10-CM

## 2023-11-27 DIAGNOSIS — Z01.84 IMMUNITY STATUS TESTING: ICD-10-CM

## 2023-11-27 PROCEDURE — 77063 BREAST TOMOSYNTHESIS BI: CPT | Mod: TC | Performed by: STUDENT IN AN ORGANIZED HEALTH CARE EDUCATION/TRAINING PROGRAM

## 2023-11-27 PROCEDURE — 77067 SCR MAMMO BI INCL CAD: CPT | Mod: TC | Performed by: STUDENT IN AN ORGANIZED HEALTH CARE EDUCATION/TRAINING PROGRAM

## 2023-11-27 PROCEDURE — 99213 OFFICE O/P EST LOW 20 MIN: CPT | Mod: 25 | Performed by: OBSTETRICS & GYNECOLOGY

## 2023-11-27 PROCEDURE — 99396 PREV VISIT EST AGE 40-64: CPT | Performed by: OBSTETRICS & GYNECOLOGY

## 2023-11-27 RX ORDER — DROSPIRENONE AND ETHINYL ESTRADIOL 0.03MG-3MG
1 KIT ORAL DAILY
Qty: 84 TABLET | Refills: 3 | Status: SHIPPED | OUTPATIENT
Start: 2023-11-27 | End: 2024-09-25

## 2023-11-27 ASSESSMENT — PATIENT HEALTH QUESTIONNAIRE - PHQ9
5. POOR APPETITE OR OVEREATING: NOT AT ALL
SUM OF ALL RESPONSES TO PHQ QUESTIONS 1-9: 1

## 2023-11-27 ASSESSMENT — ANXIETY QUESTIONNAIRES
GAD7 TOTAL SCORE: 0
2. NOT BEING ABLE TO STOP OR CONTROL WORRYING: NOT AT ALL
IF YOU CHECKED OFF ANY PROBLEMS ON THIS QUESTIONNAIRE, HOW DIFFICULT HAVE THESE PROBLEMS MADE IT FOR YOU TO DO YOUR WORK, TAKE CARE OF THINGS AT HOME, OR GET ALONG WITH OTHER PEOPLE: NOT DIFFICULT AT ALL
GAD7 TOTAL SCORE: 0
5. BEING SO RESTLESS THAT IT IS HARD TO SIT STILL: NOT AT ALL
7. FEELING AFRAID AS IF SOMETHING AWFUL MIGHT HAPPEN: NOT AT ALL
1. FEELING NERVOUS, ANXIOUS, OR ON EDGE: NOT AT ALL
3. WORRYING TOO MUCH ABOUT DIFFERENT THINGS: NOT AT ALL
6. BECOMING EASILY ANNOYED OR IRRITABLE: NOT AT ALL

## 2023-11-27 NOTE — PROGRESS NOTES
Shayna is a 43 year old  female who presents for annual exam.     Besides routine health maintenance,  she would like to discuss blood test inflammation level discuss.    HPI:  The patient's PCP is  Physician No Ref-Primary.  No primary Not fasting today     Pt presents for her annual exam.     's work did biometric screening, and though she is UTD on her labs and has been doin them with me at appropriate intervals, they both did it.   wasn't seeing a doctor and found out he had high cholesterol b/c of biometrics and now is seeing someone and has lost weight and eating healthier so for his sake the reason for biometrics worked  However she did it too and it for some reason did a CRP test and it came back elevated. In the description it mentioned heart inflammation and has strong Fhx of heart issues. Walked regularly for exercise and despite that,  and overall normal BMI her HR is really fast with fairly minimal exertion. Currently isn't exercising and walking like she was but still feels like her resting HR is higher and spikes quickly with any form of exertion.     Also mentioned that she may have had SVT on two occasions but doesn't recall if it was officially SVT or not. Both were years ago and not recent. Doesn't recall having any significant PVCs/PACs when pregnant but occasionally and rare.  Had one episode when traveling and was going to the bathroom, not straining, not dehydrated, nothing unusual and had a run of really fast HR and some palpitations she could feel. Last several minutes and resolved by arrival to hosp. The other episode was milder. Was told she could take meds, presumably beta blocker since her sister takes one for something similar. But already runs very low BP so didn't want to have s.e from hypotension    Mother had MI between 58-60 w/pre DM, and hyperlipidemia. Her maternal GF  in his early 50's from an MI. So always worries about heart disease.    Pt has gained 9#  in the last year but was very actively exercising daily and eating really healthy and now just hasn't been focused on that    Periods were really heavy for years but now on her ocps are great. Regular and monthly, short, not heavy and minimal cramping. No V.M sx or any significant PMS and as long as safe in terms of overall and heart health, prefers to stay on them.     Very seriously considering moving to [place] and are right now trying to figure that out.  If they do her 's insurance will stay the same as can work remotely but company is in [place]. If that is the case would like to still come see me once a year unless has to find someone in [place] for acute or new condition.     Had original covid vaxx series but not booster and declines it and the flu shot today    GYNECOLOGIC HISTORY:    Patient's last menstrual period was 2023 (approximate).    Regular menses? yes  Menses every 28 days.  Length of menses: 6 days    Her current contraception method is: oral contraceptives.  She  reports that she has never smoked. She has never used smokeless tobacco.    Patient is sexually active.  STD testing offered?  Declined  Last PHQ-9 score on record =       2023    11:02 AM   PHQ-9 SCORE   PHQ-9 Total Score 1     Last GAD7 score on record =       2023    11:02 AM   JARAD-7 SCORE   Total Score 0     Alcohol Score = 1    HEALTH MAINTENANCE:  Cholesterol:   Recent Labs   Lab Test 21  1350 16  1220   CHOL 218* 157   HDL 80 71   * 64   TRIG 90 108     Last Mammo:  2022 , Result: Normal, Next Mammo:   Pap:  Lab Results   Component Value Date    GYNINTERP  2022     Negative for Intraepithelial Lesion or Malignancy (NILM)    PAP NIL 10/25/2017     Colonoscopy:  NA, Result: Not applicable, Next Colonoscopy: 45 years.  Dexa:  NA    Health maintenance updated:  Yes    HISTORY:  OB History    Para Term  AB Living   4 3 3 0 1 3   SAB IAB Ectopic Multiple Live Births   1 0 0 0 3  "     # Outcome Date GA Lbr Yadiel/2nd Weight Sex Delivery Anes PTL Lv   4 Term 01/22/10 41w0d  3.969 kg (8 lb 12 oz) F Vag-Spont EPI  AIDEN      Birth Comments: Followed and delivered by Mao. Was to have cytotec for closed cvx at 41 weeks but spont labor began at 40+4      Name: Heaven   3 Term 08 38w0d  3.6 kg (7 lb 15 oz) M Vag-Spont EPI  AIDEN      Birth Comments: Followed and delivered by Mao      Name: Blake   2 SAB 07 6w0d    SAB      1 Term 06 39w0d  3.459 kg (7 lb 10 oz) F Vag-Spont EPI  AIDEN      Birth Comments: Followed by Jan then Mao and delivered by Sedrick. SROM, Pitocin augmentation      Name: Marissa       Patient Active Problem List   Diagnosis    Menorrhagia with regular cycle    Acne, unspecified acne type    Family history of ischemic heart disease    Cystocele, midline    Nocturia    Urinary frequency     Past Surgical History:   Procedure Laterality Date    NO HISTORY OF SURGERY        Social History     Tobacco Use    Smoking status: Never    Smokeless tobacco: Never   Substance Use Topics    Alcohol use: Not Currently     Alcohol/week: 0.0 standard drinks of alcohol     Comment: Occas       Problem (# of Occurrences) Relation (Name,Age of Onset)    Alzheimer Disease (1) Paternal Grandmother    Diabetes (1) Mother: pre-diabetes    Hypertension (1) Mother    Breast Cancer (1) Mother (58)    C.A.D. (1) Maternal Grandfather:  in 50's of MI    Myocardial Infarction (1) Mother (59)    Hyperlipidemia (1) Mother              Current Outpatient Medications   Medication Sig    drospirenone-ethinyl estradiol (SARAVANAN) 3-0.03 MG tablet Take 1 tablet by mouth daily    multivitamin w/minerals (THERA-VIT-M) tablet Take 1 tablet by mouth daily     No current facility-administered medications for this visit.     No Known Allergies    Past medical, surgical, social and family histories were reviewed and updated in [facility].    EXAM:  BP 98/60   Ht 1.664 m (5' 5.5\")   Wt 69.8 kg (153 lb " 12.8 oz)   LMP 11/08/2023 (Approximate)   Breastfeeding No   BMI 25.20 kg/m     BMI: Body mass index is 25.2 kg/m .    PHYSICAL EXAM:  Constitutional:   Appearance: Well nourished, well developed, alert, in no acute distress  Neck:  Lymph Nodes:  No lymphadenopathy present    Thyroid:  Gland size normal, nontender, no nodules or masses present  on palpation  Chest:  Respiratory Effort:  Breathing unlabored, CTAB  Cardiovascular:    Heart: Auscultation:  Regular rate, normal rhythm, no murmurs present  Breasts: Inspection of Breasts:  No lymphadenopathy present., Palpation of Breasts and Axillae:  No masses present on palpation, no breast tenderness., Axillary Lymph Nodes:  No lymphadenopathy present., and No nodularity, asymmetry or nipple discharge bilaterally.  Gastrointestinal:   Abdominal Examination:  Abdomen nontender to palpation, tone normal without rigidity or guarding, no masses present, umbilicus without lesions   Liver and Spleen:  No hepatomegaly present, liver nontender to palpation    Hernias:  No hernias present  Lymphatic: Lymph Nodes:  No other lymphadenopathy present  Skin:  General Inspection:  No rashes present, no lesions present, no areas of  discoloration  Neurologic:    Mental Status:  Oriented X3.  Normal strength and tone, sensory exam                grossly normal, mentation intact and speech normal.    Psychiatric:   Mentation appears normal and affect normal/bright.         Pelvic Exam:  External Genitalia:     Normal appearance for age, no discharge present, no tenderness present, no inflammatory lesions present, color normal  Vagina:     Normal vaginal vault without central or paravaginal defects, no discharge present, no inflammatory lesions present, no masses present  Bladder:     Nontender to palpation  Urethra:   Urethral Body:  Urethra palpation normal, urethra structural support normal   Urethral Meatus:  No erythema or lesions present  Cervix:     Appearance healthy, no  lesions present, nontender to palpation, no bleeding present  Uterus:     Uterus: firm, normal sized and nontender, anteverted in position.   Adnexa:     No adnexal tenderness present, no adnexal masses present  Perineum:     Perineum within normal limits, no evidence of trauma, no rashes or skin lesions present  Anus:     Anus within normal limits, no hemorrhoids present  Inguinal Lymph Nodes:     No lymphadenopathy present  Pubic Hair:     Normal pubic hair distribution for age  Genitalia and Groin:     No rashes present, no lesions present, no areas of discoloration, no masses present    COUNSELING:   Reviewed preventive health counseling, as reflected in patient instructions  Special attention given to:        Breast Health/Mammo       Regular exercise       Healthy diet/nutrition       Immunizations  Declined: Covid-19 and Influenza due to Conscientious objector             (Beth)menopause management    BMI: Body mass index is 25.2 kg/m .  Weight management plan: Discussed healthy diet and exercise guidelines    ASSESSMENT:  43 year old female with satisfactory annual exam.    ICD-10-CM    1. Encounter for gynecological examination without abnormal finding  Z01.419       2. Menorrhagia with regular cycle  N92.0 drospirenone-ethinyl estradiol (SARAVANAN) 3-0.03 MG tablet      3. Visit for oral contraceptive prescription  Z30.011 drospirenone-ethinyl estradiol (SARAVANAN) 3-0.03 MG tablet      4. Elevated C-reactive protein (CRP)  R79.82 CRP, inflammation      5. Family history of heart disease  Z82.49 CRP cardiac risk      6. Hair loss  L65.9 drospirenone-ethinyl estradiol (SARAVANAN) 3-0.03 MG tablet      7. Screening for cardiovascular condition  Z13.6 Lipid panel reflex to direct LDL Fasting      8. Screening for metabolic disorder  Z13.228 Comprehensive metabolic panel      9. Screening for thyroid disorder  Z13.29 TSH with free T4 reflex      10. Encounter for vitamin deficiency screening  Z13.21 Vitamin D  Deficiency      11. Screening for disorder of blood and blood-forming organs  Z13.0 CBC with platelets      12. Screening for diabetes mellitus  Z13.1 Hemoglobin A1c      13. Encounter for hepatitis C screening test for low risk patient  Z11.59 Hepatitis C Screen Reflex to HCV RNA Quant and Genotype      14. Screening for HIV (human immunodeficiency virus)  Z11.4 HIV Antigen Antibody Combo Cascade      15. Immunity status testing  Z01.84 Hepatitis B Surface Antibody          PLAN:    Pap is UTD for 3-4 more years.   Can follow routine ASCCP guidelines     mammo today.    Fasting labs UTD but b/c had someelevation in a CRP or cardiac CRP maybe and some flagged abnormals, though on review it was LDL in the 110s range, she would like to repeat all of that with me and wishes she'd never even done the biometric testing offered.  Patient also reports she was at about day 9 of a head cold of some sort when did the CRP and explained it is a non specific test of inflammation and could be elevated due to viral illness  Discussed CAD/CVD that leads to MI/Stroke vs palpitations and electrophysiolgic parts of the heart  She likely did have PSVT but hasn't in years and hydration, baseline genetic HR, whether she's in shape from regular exercise or not, can all determine her exertional HR  As long as no CP, SOB, neuro sx and her labs are normal, then we are controlling all the CVD variable she can control in addition to healthy eating and exercise  Repeat her labs today and do CRP and cardiac CRP and then Hep C and HIV, then will f/up with her once results are back      Ocps are going really well and great cycle control  Refills sent on ocps for the year. When on them has no acne, PMS and hair maintained b/c lost a lot when tried to stop ocps in the past  Do not feel her CVD risk or any heart sx or fhx, are a contraindication    15 minutes in addition to the routine annual preventative exam,  were spent on direct management of the  patient's other medical issues as above as well as chart review including: imaging, lab work, previous visit notes by this provider, and other provider notes, as well as chart completion on the same DOS      Alfreda Cavanaugh MD

## 2023-11-28 ENCOUNTER — LAB (OUTPATIENT)
Dept: LAB | Facility: CLINIC | Age: 43
End: 2023-11-28
Payer: COMMERCIAL

## 2023-11-28 DIAGNOSIS — Z13.6 SCREENING FOR CARDIOVASCULAR CONDITION: ICD-10-CM

## 2023-11-28 DIAGNOSIS — Z13.1 SCREENING FOR DIABETES MELLITUS: ICD-10-CM

## 2023-11-28 DIAGNOSIS — Z82.49 FAMILY HISTORY OF CARDIOVASCULAR DISEASE: Primary | ICD-10-CM

## 2023-11-28 DIAGNOSIS — Z13.29 SCREENING FOR THYROID DISORDER: ICD-10-CM

## 2023-11-28 DIAGNOSIS — Z11.59 ENCOUNTER FOR HEPATITIS C SCREENING TEST FOR LOW RISK PATIENT: ICD-10-CM

## 2023-11-28 DIAGNOSIS — Z13.21 ENCOUNTER FOR VITAMIN DEFICIENCY SCREENING: ICD-10-CM

## 2023-11-28 DIAGNOSIS — Z13.0 SCREENING FOR DISORDER OF BLOOD AND BLOOD-FORMING ORGANS: ICD-10-CM

## 2023-11-28 DIAGNOSIS — Z13.228 SCREENING FOR METABOLIC DISORDER: ICD-10-CM

## 2023-11-28 DIAGNOSIS — R79.82 ELEVATED C-REACTIVE PROTEIN (CRP): ICD-10-CM

## 2023-11-28 DIAGNOSIS — Z01.84 IMMUNITY STATUS TESTING: ICD-10-CM

## 2023-11-28 DIAGNOSIS — Z82.49 FAMILY HISTORY OF HEART DISEASE: ICD-10-CM

## 2023-11-28 LAB
ERYTHROCYTE [DISTWIDTH] IN BLOOD BY AUTOMATED COUNT: 11.9 % (ref 10–15)
HBA1C MFR BLD: 5.1 % (ref 0–5.6)
HCT VFR BLD AUTO: 42 % (ref 35–47)
HGB BLD-MCNC: 14.1 G/DL (ref 11.7–15.7)
MCH RBC QN AUTO: 29.7 PG (ref 26.5–33)
MCHC RBC AUTO-ENTMCNC: 33.6 G/DL (ref 31.5–36.5)
MCV RBC AUTO: 88 FL (ref 78–100)
PLATELET # BLD AUTO: 355 10E3/UL (ref 150–450)
RBC # BLD AUTO: 4.75 10E6/UL (ref 3.8–5.2)
WBC # BLD AUTO: 7.7 10E3/UL (ref 4–11)

## 2023-11-28 PROCEDURE — 36415 COLL VENOUS BLD VENIPUNCTURE: CPT

## 2023-11-28 PROCEDURE — 87389 HIV-1 AG W/HIV-1&-2 AB AG IA: CPT | Performed by: OBSTETRICS & GYNECOLOGY

## 2023-11-28 PROCEDURE — 83036 HEMOGLOBIN GLYCOSYLATED A1C: CPT

## 2023-11-28 PROCEDURE — 86141 C-REACTIVE PROTEIN HS: CPT

## 2023-11-28 PROCEDURE — 82306 VITAMIN D 25 HYDROXY: CPT

## 2023-11-28 PROCEDURE — 86803 HEPATITIS C AB TEST: CPT

## 2023-11-28 PROCEDURE — 85027 COMPLETE CBC AUTOMATED: CPT

## 2023-11-28 PROCEDURE — 80061 LIPID PANEL: CPT

## 2023-11-28 PROCEDURE — 80053 COMPREHEN METABOLIC PANEL: CPT

## 2023-11-28 PROCEDURE — 86706 HEP B SURFACE ANTIBODY: CPT

## 2023-11-28 PROCEDURE — 84443 ASSAY THYROID STIM HORMONE: CPT

## 2023-11-29 LAB
ALBUMIN SERPL BCG-MCNC: 4.2 G/DL (ref 3.5–5.2)
ALP SERPL-CCNC: 70 U/L (ref 40–150)
ALT SERPL W P-5'-P-CCNC: 21 U/L (ref 0–50)
ANION GAP SERPL CALCULATED.3IONS-SCNC: 11 MMOL/L (ref 7–15)
AST SERPL W P-5'-P-CCNC: 26 U/L (ref 0–45)
BILIRUB SERPL-MCNC: 0.2 MG/DL
BUN SERPL-MCNC: 12.2 MG/DL (ref 6–20)
CALCIUM SERPL-MCNC: 9.6 MG/DL (ref 8.6–10)
CHLORIDE SERPL-SCNC: 102 MMOL/L (ref 98–107)
CHOLEST SERPL-MCNC: 216 MG/DL
CREAT SERPL-MCNC: 0.79 MG/DL (ref 0.51–0.95)
CRP SERPL HS-MCNC: 5.36 MG/L
CRP SERPL-MCNC: 6.02 MG/L
DEPRECATED HCO3 PLAS-SCNC: 24 MMOL/L (ref 22–29)
EGFRCR SERPLBLD CKD-EPI 2021: >90 ML/MIN/1.73M2
GLUCOSE SERPL-MCNC: 88 MG/DL (ref 70–99)
HBV SURFACE AB SERPL IA-ACNC: 207.22 M[IU]/ML
HBV SURFACE AB SERPL IA-ACNC: REACTIVE M[IU]/ML
HCV AB SERPL QL IA: NONREACTIVE
HDLC SERPL-MCNC: 80 MG/DL
HIV 1+2 AB+HIV1 P24 AG SERPL QL IA: NONREACTIVE
LDLC SERPL CALC-MCNC: 113 MG/DL
NONHDLC SERPL-MCNC: 136 MG/DL
POTASSIUM SERPL-SCNC: 5 MMOL/L (ref 3.4–5.3)
PROT SERPL-MCNC: 7.5 G/DL (ref 6.4–8.3)
SODIUM SERPL-SCNC: 137 MMOL/L (ref 135–145)
TRIGL SERPL-MCNC: 114 MG/DL
TSH SERPL DL<=0.005 MIU/L-ACNC: 2.32 UIU/ML (ref 0.3–4.2)
VIT D+METAB SERPL-MCNC: 45 NG/ML (ref 20–50)

## 2024-09-25 ENCOUNTER — TELEPHONE (OUTPATIENT)
Dept: OBGYN | Facility: CLINIC | Age: 44
End: 2024-09-25
Payer: COMMERCIAL

## 2024-09-25 DIAGNOSIS — Z30.011 VISIT FOR ORAL CONTRACEPTIVE PRESCRIPTION: ICD-10-CM

## 2024-09-25 DIAGNOSIS — N92.0 MENORRHAGIA WITH REGULAR CYCLE: ICD-10-CM

## 2024-09-25 DIAGNOSIS — L65.9 HAIR LOSS: ICD-10-CM

## 2024-09-25 RX ORDER — DROSPIRENONE AND ETHINYL ESTRADIOL 0.03MG-3MG
1 KIT ORAL DAILY
Qty: 84 TABLET | Refills: 0 | Status: SHIPPED | OUTPATIENT
Start: 2024-09-25

## 2024-09-25 NOTE — TELEPHONE ENCOUNTER
Requested Prescriptions   Pending Prescriptions Disp Refills    ZUMANDIMINE 3-0.03 MG tablet [Pharmacy Med Name: ZUMANDIMINE 3-0.03MG TABS] 84 tablet 3     Sig: TAKE ONE TABLET BY MOUTH ONCE DAILY       Contraceptives Protocol Failed - 9/25/2024 11:55 AM        Failed - Medication indicated for associated diagnosis     Medication is associated with one or more of the following diagnoses:  Contraception  Acne  Dysmenorrhea  Menorrhagia  Amenorrhea  PCOS  Premenstrual Dysphoric Disorder  Irregular menses  Endometriosis          Passed - Patient is not a current smoker if age is 35 or older        Passed - Recent (12 mo) or future (30 days) visit within the authorizing provider's specialty     The patient must have completed an in-person or virtual visit within the past 12 months or has a future visit scheduled within the next 90 days with the authorizing provider s specialty.  Urgent care and e-visits do not quality as an office visit for this protocol.          Passed - Medication is active on med list        Passed - No active pregnancy on record        Passed - No positive pregnancy test in past 12 months           Last Written Prescription Date:  11/27/23  Last Fill Quantity: 84,  # refills: 3   Last office visit: 11/27/2023 ; last virtual visit: 12/30/2022 with prescribing provider:  Dr Cavanaugh   Future Office Visit:      Prescription approved per [facility] Refill Protocol.  Sarah Cannon RN on 9/25/2024 at 12:20 PM

## 2024-09-25 NOTE — TELEPHONE ENCOUNTER
Rx refill was sent in refill encounter.    Needs annual in November for further refills    Sarah Cannon RN on 9/25/2024 at 12:23 PM

## 2024-09-25 NOTE — TELEPHONE ENCOUNTER
Annual on 12/9/2024 - this was the only day that worked for patient. Has to be 1 year out from her last one.

## 2024-09-25 NOTE — TELEPHONE ENCOUNTER
[facility] Call Center    Phone Message    May a detailed message be left on voicemail: yes     Reason for Call: Medication Refill Request    Has the patient contacted the pharmacy for the refill? Yes   Name of medication being requested:   drospirenone-ethinyl estradiol (SARAVANAN) 3-0.03 MG tablet [51209]    Provider who prescribed the medication: Dr. Cavanaugh   [facility] [id number]  Date medication is needed: ASAP. Pt is out        Action Taken: Other: uro    Travel Screening: Not Applicable     Date of Service:

## 2024-12-05 NOTE — PROGRESS NOTES
Shayna is a 44 year old  female who presents for annual exam.     Besides routine health maintenance, she would like to discuss other BC options .    HPI:  The patient's PCP is Physician No Ref-Primary.    From a health standpoint nothing signficant going on.    Ocps for the last few yrs started for menorrhagia but also hair thinning and acne and more PMS.  had vas so it wasn't for contraception  Periods are very regular and light and last 5 days and predictable.  Rarely will miss a pill and when remembers takes 2 at once, o/w never starts new pack late, etc    However  from her  of 20 yrs this summer. Living in the house with the kids and he has an apt close by. Amicable after having a horrible 3 months. He had at least an emotional affair at work, though denies anything physical ever. Unsure about that and wants full STD testing today just so she knows she's ok regardless. Has no sx at all.    Doing well for the most part. Doing counseling and that's helping, journaled and processed everything. Had 3 months of no appetite so lost weight from that. Now feels more normal but still not nearly as hungry as she was and just being more intentional about eating and happy about this weight b/c had struggled a bit the last few years going back and forth up then down 10#.    Worried that now that needs contraception her ocps aren't effective enough and if she should do something else. However does like the benefits of ocps for all the other things besides menstrual flow.     Started online dating and the first person she actually went out with was great and have been dating a month now. Have only been S.A for the last 1-2 weeks but he's great and very supportive and caring and thoughtful and had missed that.    Has always been an easier bruiser but usually with some impact or hit. Now is having bruising very easily from things that don't seem to be hard or hurting at the time but on her legs and  buttocks and arms. Happening after/during sex but in the moment it's nothing that seems like it should cause that.  Has had low iron and anemia but that was menorrhagia related  No other easily bleeding or any fhx of that or clotting issues.    Fasting labs last year were normal. 2 yrs prior had a cardiac CRP thru biometric for insurance and it was high. Repeated with us and still that. Had no clinical indication to test it when found and since no issues at all really hasn't chosen to see cards or pursue it at this point      GYNECOLOGIC HISTORY:    Patient's last menstrual period was 2024 (approximate).    Regular menses? yes  Menses every 28 days.  Length of menses: 5 days    Her current contraception method is: oral contraceptives.  She  reports that she has never smoked. She has never used smokeless tobacco.    Patient is sexually active.  STD testing offered?  Accepted  Last PHQ-9 score on record =       2023    11:02 AM   PHQ-9 SCORE   PHQ-9 Total Score 1     Last GAD7 score on record =       2023    11:02 AM   JARAD-7 SCORE   Total Score 0     Alcohol Score = 2    HEALTH MAINTENANCE:  Cholesterol:   Recent Labs   Lab Test 23  1036 21  1350   CHOL 216* 218*   HDL 80 80   * 120*   TRIG 114 90     Last Mammo: One year ago, Result: Normal, Next Mammo: Today     Pap:   Lab Results   Component Value Date    GYNINTERP  2022     Negative for Intraepithelial Lesion or Malignancy (NILM)    PAP NIL 10/25/2017     Colonoscopy:  NA, Result: Not applicable, Next Colonoscopy: 45 years.  Dexa:  NA    Health maintenance updated:  Yes    HISTORY:  OB History    Para Term  AB Living   4 3 3 0 1 3   SAB IAB Ectopic Multiple Live Births   1 0 0 0 3      # Outcome Date GA Lbr Yadiel/2nd Weight Sex Type Anes PTL Lv   4 Term 01/22/10 41w0d  3.969 kg (8 lb 12 oz) F Vag-Spont EPI  AIDEN      Birth Comments: Followed and delivered by Mao. Was to have cytotec for closed cvx at 41  "weeks but spont labor began at 40+4      Name: Heaven   3 Term 08 38w0d  3.6 kg (7 lb 15 oz) M Vag-Spont EPI  AIDEN      Birth Comments: Followed and delivered by Mao      Name: Blake   2 SAB 07 6w0d    SAB      1 Term 06 39w0d  3.459 kg (7 lb 10 oz) F Vag-Spont EPI  AIDEN      Birth Comments: Followed by Jan then Mao and delivered by Sedrick. SROM, Pitocin augmentation      Name: Marissa       Patient Active Problem List   Diagnosis    Menorrhagia with regular cycle    Acne, unspecified acne type    Family history of ischemic heart disease    Cystocele, midline    Nocturia    Urinary frequency     Past Surgical History:   Procedure Laterality Date    NO HISTORY OF SURGERY        Social History     Tobacco Use    Smoking status: Never    Smokeless tobacco: Never   Substance Use Topics    Alcohol use: Not Currently     Alcohol/week: 0.0 standard drinks of alcohol     Comment: Occas       Problem (# of Occurrences) Relation (Name,Age of Onset)    Alzheimer Disease (1) Paternal Grandmother    Diabetes (1) Mother: pre-diabetes    Hypertension (1) Mother    Breast Cancer (1) Mother (58)    C.A.D. (1) Maternal Grandfather:  in 50's of MI    Myocardial Infarction (1) Mother (59)    Hyperlipidemia (1) Mother              Current Outpatient Medications   Medication Sig Dispense Refill    drospirenone-ethinyl estradiol (ZUMANDIMINE) 3-0.03 MG tablet Take 1 tablet by mouth daily. In a continuous fashion without the placebo pills 112 tablet 4    multivitamin w/minerals (THERA-VIT-M) tablet Take 1 tablet by mouth daily       No current facility-administered medications for this visit.     No Known Allergies    Past medical, surgical, social and family histories were reviewed and [facility] in [facility].    EXAM:  /70   Ht 1.664 m (5' 5.5\")   Wt 65.3 kg (144 lb)   LMP 2024 (Approximate)   Breastfeeding No   BMI 23.60 kg/m     BMI: Body mass index is 23.6 kg/m .    PHYSICAL " EXAM:  Constitutional:   Appearance: Well nourished, well developed, alert, in no acute distress  Neck:  Lymph Nodes:  No lymphadenopathy present    Thyroid:  Gland size normal, nontender, no nodules or masses present  on palpation  Chest:  Respiratory Effort:  Breathing unlabored, CTA bilaterally  Cardiovascular:    Heart: Auscultation:  Regular rate, normal rhythm, no murmurs present  Breasts: Axillary Lymph Nodes:  No lymphadenopathy present., No nodularity, asymmetry or nipple discharge bilaterally., and STABLE SOFT EVENLY AND BILATERALLY DISPERSED F.G AREAS  Gastrointestinal:   Abdominal Examination:  Abdomen nontender to palpation, tone normal without rigidity or guarding, no masses present, umbilicus without lesions   Liver and Spleen:  No hepatomegaly present, liver nontender to palpation    Hernias:  No hernias present  Lymphatic: Lymph Nodes:  No other lymphadenopathy present  Skin:  General Inspection:  No rashes present, no lesions present, no areas of  discoloration  Neurologic:    Mental Status:  Oriented X3.  Normal strength and tone, sensory exam                grossly normal, mentation intact and speech normal.    Psychiatric:   Mentation appears normal and affect normal/bright.         Pelvic Exam:  External Genitalia:     Normal appearance for age, no discharge present, no tenderness present, no inflammatory lesions present, color normal  Vagina:     Normal vaginal vault without central or paravaginal defects BUT JUST SOME GENERAL VAGINAL WALL LAXITY-STABLE FROM PREVIOUS, no discharge present, no inflammatory lesions present, no masses present  Bladder:     Nontender to palpation  Urethra:   Urethral Body:  Urethra palpation normal, urethra structural support normal   Urethral Meatus:  No erythema or lesions present  Cervix:     Appearance healthy, no lesions present, nontender to palpation, no bleeding present, MORE OBVIOUS ECTROPION BUT NOT ABNORMAL, HEAVIER MUCOUS DISCHARGE BUT  PHYSIOLOGIC  Uterus:     Uterus: firm, normal sized and nontender, anteverted in position.   Adnexa:     No adnexal tenderness present, no adnexal masses present  Perineum:     Perineum within normal limits, no evidence of trauma, no rashes or skin lesions present  Anus:     Anus within normal limits, no hemorrhoids present  Inguinal Lymph Nodes:     No lymphadenopathy present  Pubic Hair:     Normal pubic hair distribution for age  Genitalia and Groin:     No rashes present, no lesions present, no areas of discoloration, no masses present    COUNSELING:   Reviewed preventive health counseling, as reflected in patient instructions  Special attention given to:        Contraception    BMI: Body mass index is 23.6 kg/m .      ASSESSMENT:  44 year old female with satisfactory annual exam.    ICD-10-CM    1. Encounter for gynecological examination without abnormal finding  Z01.419       2. Menorrhagia with regular cycle  N92.0 drospirenone-ethinyl estradiol (ZUMANDIMINE) 3-0.03 MG tablet      3. Visit for oral contraceptive prescription  Z30.011 drospirenone-ethinyl estradiol (ZUMANDIMINE) 3-0.03 MG tablet      4. Hair loss  L65.9 drospirenone-ethinyl estradiol (ZUMANDIMINE) 3-0.03 MG tablet      5. Screening examination for STD (sexually transmitted disease)  Z11.3 NEISSERIA GONORRHOEA PCR     Herpes Simplex Virus 1 and 2 IgG     Treponema Abs w Reflex to RPR and Titer     Hepatitis C Screen Reflex to HCV RNA Quant and Genotype     Multiplex Vaginal Panel by PCR     Hepatitis C Screen Reflex to HCV RNA Quant and Genotype      6. Screening for chlamydial disease  Z11.8 CHLAMYDIA TRACHOMATIS PCR      7. Screening for HIV (human immunodeficiency virus)  Z11.4 HIV Antigen Antibody Combo Cascade      8. Immunity status testing  Z01.84 Hepatitis B Surface Antibody     Hepatitis B Surface Antibody      9. Easy bruising  R23.3 CBC with platelets     CBC with platelets      10. History of iron deficiency  Z86.39 CBC with  platelets     Iron and iron binding capacity     Ferritin     CBC with platelets     Iron and iron binding capacity     Ferritin          PLAN:  Pap is UTD for 1 more year only due to recent separation and starting to date so therefore more exposure, but not d/t any abnormal history.   Can otherwise follow routine ASCCP guidelines     Mammo today    Fasting labs deferred this year given age and normal BMI and all normal last year.  Discussed pros/cons of repeating another cardiac CRP or seeing cards and after discussion of her risk factors and family history, her risk is quite low so no needs to have screened with it in the first place through Nabil's work, so won't continue testing unless clinically indicated.    However will do full STD testing today including MVP for trich and BV/Yeast though no sx of anything and no clinical features of anything.    Discussed viral transmission can take from 2-4 weeks but up to 3+ months, so if testing today, and just recent new partner, would consider repeat testing in 3 months to confirm viral tests vs bacterial things  That would be to r/o anything from new partner though today's should rule out anything from any potential physical exposures from nabil, if it's all negative.  Discussed HSV-1&2 and not typically recommended screening but reviewed my rationale for doing it, never has had cold sores or any genital lesions of concern but would like ot have it tested.    Patient has bruising on upper thighs/buttocks but has close to the surface spider web veins in those places and so despite not painful in the moment, it is just the physical squeeze and pressure at those sites leading to her bruising most likely  Will check CBC for plt count, as well as repeat on iron/ferritin as was low in past but when had menorrhagia  Would expect they will be normal now and reassured her bruising seems appropriate and not concerning.    Discussed contraception and efficacy.  Mirena IUD could  be very slightly more effective than appropriate ocp use, and easier, and lower hormone  However ocps were also for hair and acne and PMS sx and the mirena wouldn't provide that  Discussed errors in ocps that contribute to lowered efficacy and also discussed continuous ocps.  Has done that for a pack here or there and no BTB and worked  Reviewed that this is very safe, the chance of BTB and how to mitigate that by allowing 5 day withdrawal but that continuous dosing makes for even lower risk of ocp failure so would like to continue with them.  Refills sent on ocp andrzej, to be taken continuously, for the year.     Patient declined flu and covid vaxx today  However discussed gardasil now that she is dating and does have new HPV exposure risk and patient wasn't sure if she'd ever had it and not in [place]   Presumes she would have declined while  b/c would have thought it wasn't needed in her case and now is more open to it.    Patient left before gardasil need found, will contact her and let her know she can return for just vaxx at her convenience.    20  minutes in addition to the routine annual preventative exam,  was spent on direct management of the patient's other medical issue(s) as above, including chart review and chart completion, on the same DOS      Alfreda Cavanaugh MD

## 2024-12-09 ENCOUNTER — ANCILLARY PROCEDURE (OUTPATIENT)
Dept: MAMMOGRAPHY | Facility: CLINIC | Age: 44
End: 2024-12-09
Payer: COMMERCIAL

## 2024-12-09 ENCOUNTER — OFFICE VISIT (OUTPATIENT)
Dept: OBGYN | Facility: CLINIC | Age: 44
End: 2024-12-09
Payer: COMMERCIAL

## 2024-12-09 VITALS
DIASTOLIC BLOOD PRESSURE: 70 MMHG | WEIGHT: 144 LBS | HEIGHT: 66 IN | BODY MASS INDEX: 23.14 KG/M2 | SYSTOLIC BLOOD PRESSURE: 106 MMHG

## 2024-12-09 DIAGNOSIS — Z11.3 SCREENING EXAMINATION FOR STD (SEXUALLY TRANSMITTED DISEASE): ICD-10-CM

## 2024-12-09 DIAGNOSIS — R23.3 EASY BRUISING: ICD-10-CM

## 2024-12-09 DIAGNOSIS — Z86.39 HISTORY OF IRON DEFICIENCY: ICD-10-CM

## 2024-12-09 DIAGNOSIS — Z12.31 VISIT FOR SCREENING MAMMOGRAM: ICD-10-CM

## 2024-12-09 DIAGNOSIS — Z11.4 SCREENING FOR HIV (HUMAN IMMUNODEFICIENCY VIRUS): ICD-10-CM

## 2024-12-09 DIAGNOSIS — N92.0 MENORRHAGIA WITH REGULAR CYCLE: ICD-10-CM

## 2024-12-09 DIAGNOSIS — L65.9 HAIR LOSS: ICD-10-CM

## 2024-12-09 DIAGNOSIS — Z11.8 SCREENING FOR CHLAMYDIAL DISEASE: ICD-10-CM

## 2024-12-09 DIAGNOSIS — Z01.419 ENCOUNTER FOR GYNECOLOGICAL EXAMINATION WITHOUT ABNORMAL FINDING: Primary | ICD-10-CM

## 2024-12-09 DIAGNOSIS — Z01.84 IMMUNITY STATUS TESTING: ICD-10-CM

## 2024-12-09 DIAGNOSIS — Z30.011 VISIT FOR ORAL CONTRACEPTIVE PRESCRIPTION: ICD-10-CM

## 2024-12-09 LAB
BACTERIAL VAGINOSIS VAG-IMP: NEGATIVE
CANDIDA DNA VAG QL NAA+PROBE: NOT DETECTED
CANDIDA GLABRATA / CANDIDA KRUSEI DNA: NOT DETECTED
ERYTHROCYTE [DISTWIDTH] IN BLOOD BY AUTOMATED COUNT: 13 % (ref 10–15)
HCT VFR BLD AUTO: 41 % (ref 35–47)
HGB BLD-MCNC: 13.2 G/DL (ref 11.7–15.7)
HSV1 IGG SERPL QL IA: 12 INDEX
HSV1 IGG SERPL QL IA: ABNORMAL
HSV2 IGG SERPL QL IA: 0.04 INDEX
HSV2 IGG SERPL QL IA: ABNORMAL
MCH RBC QN AUTO: 28.9 PG (ref 26.5–33)
MCHC RBC AUTO-ENTMCNC: 32.2 G/DL (ref 31.5–36.5)
MCV RBC AUTO: 90 FL (ref 78–100)
PLATELET # BLD AUTO: 339 10E3/UL (ref 150–450)
RBC # BLD AUTO: 4.57 10E6/UL (ref 3.8–5.2)
T PALLIDUM AB SER QL: NONREACTIVE
T VAGINALIS DNA VAG QL NAA+PROBE: NOT DETECTED
WBC # BLD AUTO: 6.6 10E3/UL (ref 4–11)

## 2024-12-09 PROCEDURE — 83540 ASSAY OF IRON: CPT | Performed by: OBSTETRICS & GYNECOLOGY

## 2024-12-09 PROCEDURE — 36415 COLL VENOUS BLD VENIPUNCTURE: CPT | Performed by: OBSTETRICS & GYNECOLOGY

## 2024-12-09 PROCEDURE — 86780 TREPONEMA PALLIDUM: CPT | Performed by: OBSTETRICS & GYNECOLOGY

## 2024-12-09 PROCEDURE — 83550 IRON BINDING TEST: CPT | Performed by: OBSTETRICS & GYNECOLOGY

## 2024-12-09 PROCEDURE — 82728 ASSAY OF FERRITIN: CPT | Performed by: OBSTETRICS & GYNECOLOGY

## 2024-12-09 PROCEDURE — 77063 BREAST TOMOSYNTHESIS BI: CPT | Mod: TC | Performed by: RADIOLOGY

## 2024-12-09 PROCEDURE — 99213 OFFICE O/P EST LOW 20 MIN: CPT | Mod: 25 | Performed by: OBSTETRICS & GYNECOLOGY

## 2024-12-09 PROCEDURE — 85027 COMPLETE CBC AUTOMATED: CPT | Performed by: OBSTETRICS & GYNECOLOGY

## 2024-12-09 PROCEDURE — 99459 PELVIC EXAMINATION: CPT | Performed by: OBSTETRICS & GYNECOLOGY

## 2024-12-09 PROCEDURE — 99396 PREV VISIT EST AGE 40-64: CPT | Performed by: OBSTETRICS & GYNECOLOGY

## 2024-12-09 PROCEDURE — 86706 HEP B SURFACE ANTIBODY: CPT | Performed by: OBSTETRICS & GYNECOLOGY

## 2024-12-09 PROCEDURE — 87491 CHLMYD TRACH DNA AMP PROBE: CPT | Performed by: OBSTETRICS & GYNECOLOGY

## 2024-12-09 PROCEDURE — 0352U MULTIPLEX VAGINAL PANEL BY PCR: CPT | Performed by: OBSTETRICS & GYNECOLOGY

## 2024-12-09 PROCEDURE — 86803 HEPATITIS C AB TEST: CPT | Performed by: OBSTETRICS & GYNECOLOGY

## 2024-12-09 PROCEDURE — 77067 SCR MAMMO BI INCL CAD: CPT | Mod: TC | Performed by: RADIOLOGY

## 2024-12-09 PROCEDURE — 87591 N.GONORRHOEAE DNA AMP PROB: CPT | Performed by: OBSTETRICS & GYNECOLOGY

## 2024-12-09 PROCEDURE — 87389 HIV-1 AG W/HIV-1&-2 AB AG IA: CPT | Performed by: OBSTETRICS & GYNECOLOGY

## 2024-12-09 PROCEDURE — 86695 HERPES SIMPLEX TYPE 1 TEST: CPT | Performed by: OBSTETRICS & GYNECOLOGY

## 2024-12-09 PROCEDURE — 86696 HERPES SIMPLEX TYPE 2 TEST: CPT | Performed by: OBSTETRICS & GYNECOLOGY

## 2024-12-09 RX ORDER — DROSPIRENONE AND ETHINYL ESTRADIOL 0.03MG-3MG
1 KIT ORAL DAILY
Qty: 112 TABLET | Refills: 4 | Status: SHIPPED | OUTPATIENT
Start: 2024-12-09

## 2024-12-09 ASSESSMENT — ANXIETY QUESTIONNAIRES
1. FEELING NERVOUS, ANXIOUS, OR ON EDGE: NOT AT ALL
IF YOU CHECKED OFF ANY PROBLEMS ON THIS QUESTIONNAIRE, HOW DIFFICULT HAVE THESE PROBLEMS MADE IT FOR YOU TO DO YOUR WORK, TAKE CARE OF THINGS AT HOME, OR GET ALONG WITH OTHER PEOPLE: NOT DIFFICULT AT ALL
7. FEELING AFRAID AS IF SOMETHING AWFUL MIGHT HAPPEN: NOT AT ALL
6. BECOMING EASILY ANNOYED OR IRRITABLE: SEVERAL DAYS
2. NOT BEING ABLE TO STOP OR CONTROL WORRYING: NOT AT ALL
5. BEING SO RESTLESS THAT IT IS HARD TO SIT STILL: NOT AT ALL
3. WORRYING TOO MUCH ABOUT DIFFERENT THINGS: NOT AT ALL
GAD7 TOTAL SCORE: 1
GAD7 TOTAL SCORE: 1

## 2024-12-09 ASSESSMENT — PATIENT HEALTH QUESTIONNAIRE - PHQ9
SUM OF ALL RESPONSES TO PHQ QUESTIONS 1-9: 2
5. POOR APPETITE OR OVEREATING: NOT AT ALL

## 2024-12-09 NOTE — Clinical Note
Can we contact her and let her know it doesn't look like she ever had the HPV vaccine. Meant to let her know before she left and missed her.  If she'd like, she can schedule just MA/Rn appointments and do it. First 2 shots 2 months apart, and can time the second of those for when/if she wants to repeat STI testing, and then a third shot 4 months after that. Just so she knows the interval as never got to tell her. Milan PRINCE

## 2024-12-10 LAB
C TRACH DNA SPEC QL NAA+PROBE: NEGATIVE
FERRITIN SERPL-MCNC: 35 NG/ML (ref 6–175)
HBV SURFACE AB SERPL IA-ACNC: 267 M[IU]/ML
HBV SURFACE AB SERPL IA-ACNC: REACTIVE M[IU]/ML
HCV AB SERPL QL IA: NONREACTIVE
HIV 1+2 AB+HIV1 P24 AG SERPL QL IA: NONREACTIVE
IRON BINDING CAPACITY (ROCHE): 416 UG/DL (ref 240–430)
IRON SATN MFR SERPL: 16 % (ref 15–46)
IRON SERPL-MCNC: 66 UG/DL (ref 37–145)
N GONORRHOEA DNA SPEC QL NAA+PROBE: NEGATIVE

## 2025-01-21 ENCOUNTER — MYC MEDICAL ADVICE (OUTPATIENT)
Dept: OBGYN | Facility: CLINIC | Age: 45
End: 2025-01-21

## 2025-01-21 ENCOUNTER — E-VISIT (OUTPATIENT)
Dept: OBGYN | Facility: CLINIC | Age: 45
End: 2025-01-21
Payer: COMMERCIAL

## 2025-01-21 DIAGNOSIS — L30.9 ECZEMA: Primary | ICD-10-CM

## 2025-01-21 PROCEDURE — 99207 PR NO CHARGE NURSE ONLY: CPT
